# Patient Record
Sex: MALE | Race: WHITE | NOT HISPANIC OR LATINO | Employment: FULL TIME | URBAN - METROPOLITAN AREA
[De-identification: names, ages, dates, MRNs, and addresses within clinical notes are randomized per-mention and may not be internally consistent; named-entity substitution may affect disease eponyms.]

---

## 2017-05-15 ENCOUNTER — OFFICE VISIT (OUTPATIENT)
Dept: URGENT CARE | Facility: CLINIC | Age: 42
End: 2017-05-15
Payer: COMMERCIAL

## 2017-05-15 VITALS
WEIGHT: 182 LBS | DIASTOLIC BLOOD PRESSURE: 90 MMHG | HEART RATE: 73 BPM | OXYGEN SATURATION: 98 % | BODY MASS INDEX: 24.65 KG/M2 | TEMPERATURE: 98.6 F | SYSTOLIC BLOOD PRESSURE: 120 MMHG | HEIGHT: 72 IN

## 2017-05-15 DIAGNOSIS — H10.32 ACUTE BACTERIAL CONJUNCTIVITIS OF LEFT EYE: ICD-10-CM

## 2017-05-15 PROCEDURE — 99214 OFFICE O/P EST MOD 30 MIN: CPT | Performed by: PHYSICIAN ASSISTANT

## 2017-05-15 RX ORDER — POLYMYXIN B SULFATE AND TRIMETHOPRIM 1; 10000 MG/ML; [USP'U]/ML
1 SOLUTION OPHTHALMIC EVERY 4 HOURS
Qty: 10 ML | Refills: 0 | Status: SHIPPED | OUTPATIENT
Start: 2017-05-15 | End: 2017-05-25

## 2017-05-15 ASSESSMENT — ENCOUNTER SYMPTOMS
FEVER: 0
VOMITING: 0
CHILLS: 0
NAUSEA: 0
EYE REDNESS: 1
WHEEZING: 0
SHORTNESS OF BREATH: 0
EYE DISCHARGE: 1

## 2017-05-15 NOTE — MR AVS SNAPSHOT
"        Moy Enamorado   5/15/2017 6:30 PM   Office Visit   MRN: [id number]    Department:  [facility]   Dept Phone:  [phone]    Description:  Male : 1975   Provider:  Ranjit Garcia PA-C           Reason for Visit     Pink Eye L eye,feels sore,itchy this started today       Allergies as of 5/15/2017     No Known Allergies      You were diagnosed with     Acute bacterial conjunctivitis of left eye   [[id number]]         Vital Signs     Blood Pressure Pulse Temperature Height Weight Body Mass Index    120/90 mmHg 73 37 °C (98.6 °F) 1.829 m (6' 0.01\") 82.555 kg (182 lb) 24.68 kg/m2    Oxygen Saturation                   98%           Basic Information     Date Of Birth Sex Race Ethnicity Preferred Language    1975 Male White Non- English      Health Maintenance        Date Due Completion Dates    IMM DTaP/Tdap/Td Vaccine (1 - Tdap) 3/15/1993 ---            Current Immunizations     No immunizations on file.      Below and/or attached are the medications your provider expects you to take. Review all of your home medications and newly ordered medications with your provider and/or pharmacist. Follow medication instructions as directed by your provider and/or pharmacist. Please keep your medication list with you and share with your provider. Update the information when medications are discontinued, doses are changed, or new medications (including over-the-counter products) are added; and carry medication information at all times in the event of emergency situations     Allergies:  No Known Allergies          Medications  Valid as of: May 16, 2017 -  1:10 PM    Generic Name Brand Name Tablet Size Instructions for use    Penicillin V Potassium (Tab) VEETID 500 MG Take 1 Tab by mouth 3 times a day.        Polymyxin B-Trimethoprim (Solution) POLYTRIM 14814-8.1 UNIT/ML-% Place 1 Drop in left eye every 4 hours for 10 days.        .                 Medicines prescribed today were sent to:    " [facility] [address] [facility] [place] - [address]    [address] [place] [address]    Phone: [phone] Fax: [phone]    Open 24 Hours?: Yes      Medication refill instructions:       If your prescription bottle indicates you have medication refills left, it is not necessary to call your provider’s office. Please contact your pharmacy and they will refill your medication.    If your prescription bottle indicates you do not have any refills left, you may request refills at any time through one of the following ways: The online [employer] system (except Urgent Care), by calling your provider’s office, or by asking your pharmacy to contact your provider’s office with a refill request. Medication refills are processed only during regular business hours and may not be available until the next business day. Your provider may request additional information or to have a follow-up visit with you prior to refilling your medication.   *Please Note: Medication refills are assigned a new Rx number when refilled electronically. Your pharmacy may indicate that no refills were authorized even though a new prescription for the same medication is available at the pharmacy. Please request the medicine by name with the pharmacy before contacting your provider for a refill.           [employer] Access Code: [email or web address]  Expires: 6/15/2017  1:10 PM    [employer]  A secure, online tool to manage your health information     [employer]’s [employer]® is a secure, online tool that connects you to your personalized health information from the privacy of your home -- day or night - making it very easy for you to manage your healthcare. Once the activation process is completed, you can even access your medical information using the [employer] aga, which is available for free in the [employer] Aga store or Google Play store.     [employer] provides the following levels of access (as shown below):   My Chart  Features   [facility] Doctor [facility]  Specialists [facility]  Urgent  Care [facility]  Primary Care  Doctor   Email your healthcare team securely and privately 24/7 X X X    Manage appointments: schedule your next appointment; view details of past/upcoming appointments X      Request prescription refills. X      View recent personal medical records, including lab and immunizations X X X X   View health record, including health history, allergies, medications X X X X   Read reports about your outpatient visits, procedures, consult and ER notes X X X X   See your discharge summary, which is a recap of your hospital and/or ER visit that includes your diagnosis, lab results, and care plan. X X       How to register for [employer]:  1. Go to  [email or web address].  2. Click on the Sign Up Now box, which takes you to the New Member Sign Up page. You will need to provide the following information:  a. Enter your [employer] Access Code exactly as it appears at the top of this page. (You will not need to use this code after you’ve completed the sign-up process. If you do not sign up before the expiration date, you must request a new code.)   b. Enter your date of birth.   c. Enter your home email address.   d. Click Submit, and follow the next screen’s instructions.  3. Create a [employer] ID. This will be your [employer] login ID and cannot be changed, so think of one that is secure and easy to remember.  4. Create a [employer] password. You can change your password at any time.  5. Enter your Password Reset Question and Answer. This can be used at a later time if you forget your password.   6. Enter your e-mail address. This allows you to receive e-mail notifications when new information is available in [employer].  7. Click Sign Up. You can now view your health information.    For assistance activating your [employer] account, call [phone]

## 2017-05-16 ASSESSMENT — ENCOUNTER SYMPTOMS
PHOTOPHOBIA: 0
COUGH: 0
EYE PAIN: 0
BLURRED VISION: 0
DOUBLE VISION: 0
SORE THROAT: 0
SPUTUM PRODUCTION: 0

## 2017-05-16 NOTE — PROGRESS NOTES
"Subjective:      Moy Enamorado is a 42 y.o. male who presents with Pink Eye            Other  Pertinent negatives include no chills, congestion, coughing, fever, nausea, rash, sore throat or vomiting.   notes to left eye, irritation, redness and lots of discharge from left eye, gunky eye between lid, vision is still normal w/ discharge, fatigue feeling to eye, denies fever/chills/sorethroat/cough/ear pain/nausea/votimnig. Daughter had conjunctivitis.     Review of Systems   Constitutional: Negative for fever and chills.   HENT: Negative for congestion, ear pain and sore throat.    Eyes: Positive for discharge and redness. Negative for blurred vision, double vision, photophobia and pain.   Respiratory: Negative for cough, sputum production, shortness of breath and wheezing.    Gastrointestinal: Negative for nausea and vomiting.   Skin: Negative for rash.       PMH:  has no past medical history on file.  MEDS:   Current outpatient prescriptions:   •  penicillin v potassium (VEETID) 500 MG Tab, Take 1 Tab by mouth 3 times a day., Disp: 28 Tab, Rfl: 0  ALLERGIES: No Known Allergies  SURGHX: No past surgical history on file.  SOCHX:    FH: Family history was reviewed, no pertinent findings to report    I have worn a mask for the entire encounter with this patient.      Objective:     /90 mmHg  Pulse 73  Temp(Src) 37 °C (98.6 °F)  Ht 1.829 m (6' 0.01\")  Wt 82.555 kg (182 lb)  BMI 24.68 kg/m2  SpO2 98%     Physical Exam   Constitutional: He is oriented to person, place, and time. He appears well-developed and well-nourished. No distress.   HENT:   Head: Normocephalic and atraumatic.   Right Ear: External ear normal.   Left Ear: External ear normal.   Nose: Nose normal.   Eyes: EOM and lids are normal. Pupils are equal, round, and reactive to light. Right eye exhibits no chemosis, no discharge, no exudate and no hordeolum. No foreign body present in the right eye. Left eye exhibits exudate. Left eye exhibits no " chemosis, no discharge and no hordeolum. No foreign body present in the left eye. Right conjunctiva has no hemorrhage. Left conjunctiva is injected. Left conjunctiva has no hemorrhage. No scleral icterus.   Neck: Neck supple.   Pulmonary/Chest: Effort normal. No respiratory distress.   Musculoskeletal: Normal range of motion.   Neurological: He is alert and oriented to person, place, and time. Coordination normal.   Skin: Skin is warm and dry. He is not diaphoretic. No pallor.   Psychiatric: He has a normal mood and affect.   Nursing note and vitals reviewed.              Assessment/Plan:   1. Acute bacterial conjunctivitis of left eye  Supportive care is reviewed with patient/caregiver - recommend to push PO fluids and electrolytes, we discuss the typical course of bacterial conj, as well as review concern for infectious nature of dz  Return to clinic with lack of resolution or progression of symptoms.  ER precautions with any worsening symptoms are reviewed with patient/caregiver and they do express understanding    - polymixin-trimethoprim (POLYTRIM) 08847-7.1 UNIT/ML-% Solution; Place 1 Drop in left eye every 4 hours for 10 days.  Dispense: 10 mL; Refill: 0

## 2017-09-30 ENCOUNTER — OFFICE VISIT (OUTPATIENT)
Dept: URGENT CARE | Facility: CLINIC | Age: 42
End: 2017-09-30
Payer: COMMERCIAL

## 2017-09-30 VITALS
TEMPERATURE: 98.2 F | HEIGHT: 72 IN | HEART RATE: 72 BPM | OXYGEN SATURATION: 98 % | RESPIRATION RATE: 16 BRPM | WEIGHT: 177 LBS | DIASTOLIC BLOOD PRESSURE: 78 MMHG | SYSTOLIC BLOOD PRESSURE: 114 MMHG | BODY MASS INDEX: 23.98 KG/M2

## 2017-09-30 DIAGNOSIS — S61.412A LACERATION OF LEFT HAND WITHOUT FOREIGN BODY, INITIAL ENCOUNTER: ICD-10-CM

## 2017-09-30 PROCEDURE — 12001 RPR S/N/AX/GEN/TRNK 2.5CM/<: CPT | Performed by: PHYSICIAN ASSISTANT

## 2017-09-30 RX ORDER — CEFUROXIME AXETIL 500 MG/1
500 TABLET ORAL 2 TIMES DAILY
Qty: 10 TAB | Refills: 0 | Status: SHIPPED | OUTPATIENT
Start: 2017-09-30 | End: 2017-10-05

## 2017-09-30 ASSESSMENT — ENCOUNTER SYMPTOMS
CONSTITUTIONAL NEGATIVE: 1
NEUROLOGICAL NEGATIVE: 1
MUSCULOSKELETAL NEGATIVE: 1
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
## 2017-11-16 ENCOUNTER — OFFICE VISIT (OUTPATIENT)
Dept: MEDICAL GROUP | Facility: MEDICAL CENTER | Age: 42
End: 2017-11-16
Payer: COMMERCIAL

## 2017-11-16 VITALS
WEIGHT: 186.29 LBS | BODY MASS INDEX: 25.23 KG/M2 | TEMPERATURE: 97.7 F | RESPIRATION RATE: 20 BRPM | SYSTOLIC BLOOD PRESSURE: 120 MMHG | HEART RATE: 84 BPM | HEIGHT: 72 IN | DIASTOLIC BLOOD PRESSURE: 80 MMHG | OXYGEN SATURATION: 97 %

## 2017-11-16 DIAGNOSIS — Z13.1 SCREENING FOR DIABETES MELLITUS: ICD-10-CM

## 2017-11-16 DIAGNOSIS — Z00.00 HEALTHCARE MAINTENANCE: ICD-10-CM

## 2017-11-16 DIAGNOSIS — Z23 NEED FOR VACCINATION: ICD-10-CM

## 2017-11-16 DIAGNOSIS — Z23 NEED FOR INFLUENZA VACCINATION: ICD-10-CM

## 2017-11-16 DIAGNOSIS — Z13.6 SCREENING FOR ISCHEMIC HEART DISEASE: ICD-10-CM

## 2017-11-16 PROCEDURE — 90471 IMMUNIZATION ADMIN: CPT | Performed by: FAMILY MEDICINE

## 2017-11-16 PROCEDURE — 90686 IIV4 VACC NO PRSV 0.5 ML IM: CPT | Performed by: FAMILY MEDICINE

## 2017-11-16 PROCEDURE — 99396 PREV VISIT EST AGE 40-64: CPT | Mod: 25 | Performed by: FAMILY MEDICINE

## 2017-11-16 ASSESSMENT — PATIENT HEALTH QUESTIONNAIRE - PHQ9: CLINICAL INTERPRETATION OF PHQ2 SCORE: 0

## 2017-11-17 NOTE — PROGRESS NOTES
[facility]  Progress Note  Established Patient    Subjective:   Moy Enamorado is a 42 y.o. male here today for a wellness exam.     Healthcare maintenance  Lipids: ordered.   Fasting Glucose: ordered.    PHQ2: done 11/16/17 and normal.     Flu vaccine: given 11/16/17.   Tdap: recommended. Patient will check with insurance to ensure coverage.    Patient does try to eat well. He does not exercise much. He does have occasional sleep maintenance and sleep onset insomnia. He has been using melatonin recently and is tolerating the medicine well.      No current outpatient prescriptions on file prior to visit.     No current facility-administered medications on file prior to visit.        Past Medical History:   Diagnosis Date   • Asthma     Childhood   • Migraine        Allergies: Patient has no known allergies.    Surgical History:  has a past surgical history that includes nasal reconstruction (1990).    Social History:  reports that he has never smoked. He has never used smokeless tobacco. He reports that he drinks about 3.0 oz of alcohol per week . He reports that he does not use drugs.    ROS: no fever.        Objective:     Vitals:    11/16/17 1610   BP: 120/80   Pulse: 84   Resp: 20   Temp: 36.5 °C (97.7 °F)   SpO2: 97%   Weight: 84.5 kg (186 lb 4.6 oz)   Height: 1.829 m (6')       Physical Exam:  General: alert in no apparent distress.   Cardio: regular rate and rhythm, no murmurs, rubs or gallops.   Resp: CTAB no w/r/r.   Abd: soft, NTND.         Assessment and Plan:     1. Need for influenza vaccination  - INFLUENZA VACCINE QUAD INJ >3Y(PF)    2. Healthcare maintenance  - discussed diet and exercise.   - discussed sleep hygiene, handout given. Advised patient he may continue to use melatonin but recommended 1 mg nightly.   - see HPI.       Followup: Return in about 1 year (around 11/16/2018), or if symptoms worsen or fail to improve, for Wellness Visit, Long.

## 2018-06-06 ENCOUNTER — OFFICE VISIT (OUTPATIENT)
Dept: MEDICAL GROUP | Facility: MEDICAL CENTER | Age: 43
End: 2018-06-06
Payer: COMMERCIAL

## 2018-06-06 VITALS
OXYGEN SATURATION: 95 % | WEIGHT: 179 LBS | HEIGHT: 72 IN | DIASTOLIC BLOOD PRESSURE: 70 MMHG | SYSTOLIC BLOOD PRESSURE: 108 MMHG | BODY MASS INDEX: 24.24 KG/M2 | TEMPERATURE: 98.7 F | HEART RATE: 58 BPM | RESPIRATION RATE: 18 BRPM

## 2018-06-06 DIAGNOSIS — M54.32 SCIATICA OF LEFT SIDE: ICD-10-CM

## 2018-06-06 PROCEDURE — 99214 OFFICE O/P EST MOD 30 MIN: CPT | Performed by: FAMILY MEDICINE

## 2018-06-06 RX ORDER — GABAPENTIN 100 MG/1
CAPSULE ORAL
Qty: 60 CAP | Refills: 0 | Status: SHIPPED | OUTPATIENT
Start: 2018-06-06 | End: 2019-05-06

## 2018-06-06 ASSESSMENT — PAIN SCALES - GENERAL: PAINLEVEL: 3=SLIGHT PAIN

## 2018-06-08 NOTE — PROGRESS NOTES
This medical record contains text that has been entered with the assistance of computer voice recognition and dictation software.  Therefore, it may contain unintended errors in text, spelling, punctuation, or grammar        Chief Complaint   Patient presents with   • Pain     Lt. gluteus pain x 1 mo   • Tingling     Lt. foot x 1 day       Moy Enamorado is a 43 y.o. male here evaluation and management of: L foot tingling and L gluteal pain which started no injury or inciting event 2-3 days ago   Pt has had simmilar symptoms R side before. He describes himself as sedentery but did admit to starting to try to run on the tredmil more lately   No fever/chill        Current Outpatient Prescriptions   Medication Sig Dispense Refill   • gabapentin (NEURONTIN) 100 MG Cap Take 1-3 capsules at night as needed 60 Cap 0     No current facility-administered medications for this visit.      Patient Active Problem List    Diagnosis Date Noted   • Healthcare maintenance 11/16/2017     Past Surgical History:   Procedure Laterality Date   • NASAL RECONSTRUCTION  1990      Social History   Substance Use Topics   • Smoking status: Never Smoker   • Smokeless tobacco: Never Used   • Alcohol use 3.0 oz/week     5 Cans of beer per week     Family History   Problem Relation Age of Onset   • Other Mother      Tremor           ROS    all review of system completed and negative except for those listed above     Objective:     Blood pressure 108/70, pulse (!) 58, temperature 37.1 °C (98.7 °F), resp. rate 18, height 1.829 m (6'), weight 81.2 kg (179 lb), SpO2 95 %. Body mass index is 24.28 kg/m².  Physical Exam:        GEN: comfortable, alert and oriented, well nourished, well developed, in no apparent distress   HEENT: NCAT, eyes: pupils equal and reactive, sclera white, EOMIT, good dentition  HEART: limbs warm and well perfused, regular rate, no JVD, no lower extremity edema  LUNGS: speaking in full sentences, not in apparent respiratory  distress, no audible wheezes  MSK: normal tone and bulk, no swelling of the joints, gait steady and normal       No midline ttp back  + straight leg raise  No ttp gluteal area   Lower extremity strenght sensation and reflex in tact   Able to toe walk no difficulty   ?weakly positive gluteal challenge exercises          Assessment and Plan:   The following treatment plan was discussed        Problem List Items Addressed This Visit     None      Visit Diagnoses     Sciatica of left side        Relevant Medications    gabapentin (NEURONTIN) 100 MG Cap    Other Relevant Orders    REFERRAL TO PHYSICAL THERAPY Reason for Therapy: Eval/Treat/Report    DX-LUMBAR SPINE-2 OR 3 VIEWS        I discuss RICE therapy   Ref to PT   I would like him to get X ray as he had + straight leg raise   I cannot tell if this is lumbosacral radiculopathy or gluteal strain-->sciatica but I am leaning towards lumbosacral given the ++ straight leg raise          Instructed to follow up if symptoms worsen or fail to improve, ER/UC precautions discussed as well    Inge Avalos MD  [facility]   [address] [place] [address]   [place] [id number]  Phone: [phone]

## 2018-06-22 ENCOUNTER — PHYSICAL THERAPY (OUTPATIENT)
Dept: PHYSICAL THERAPY | Facility: REHABILITATION | Age: 43
End: 2018-06-22
Attending: FAMILY MEDICINE
Payer: COMMERCIAL

## 2018-06-22 DIAGNOSIS — M51.27 LUMBOSACRAL DISC HERNIATION: ICD-10-CM

## 2018-06-22 DIAGNOSIS — R29.898 WEAKNESS OF LEFT LOWER EXTREMITY: ICD-10-CM

## 2018-06-22 DIAGNOSIS — M54.42 ACUTE BILATERAL LOW BACK PAIN WITH LEFT-SIDED SCIATICA: ICD-10-CM

## 2018-06-22 PROCEDURE — 97110 THERAPEUTIC EXERCISES: CPT

## 2018-06-22 PROCEDURE — 97535 SELF CARE MNGMENT TRAINING: CPT

## 2018-06-22 PROCEDURE — 97161 PT EVAL LOW COMPLEX 20 MIN: CPT

## 2018-06-22 SDOH — ECONOMIC STABILITY: GENERAL: QUALITY OF LIFE: GOOD

## 2018-06-22 ASSESSMENT — ENCOUNTER SYMPTOMS
ALLEVIATING FACTORS: STRETCHING
QUALITY: SHARP
QUALITY: TINGLING
EXACERBATED BY: ACTIVITY
EXACERBATED BY: STAIR CLIMBING
PAIN SCALE: 2
PAIN SCALE AT LOWEST: 1
EXACERBATED BY: RUNNING
PAIN SCALE AT HIGHEST: 4
PAIN TIMING: WHEN ACTIVE
EXACERBATED BY: JUMPING
QUALITY: TIGHTNESS

## 2018-06-22 NOTE — OP THERAPY EVALUATION
Outpatient Physical Therapy  INITIAL EVALUATION    [facility] [address]  [address]  [facility] [address]  Phone:  [phone]  Fax:  [phone]    Date of Evaluation: 2018    Patient: Moy Enamorado  YOB: 1975  MRN: [id number]     Referring Provider: Inge Avalos M.D.  [address]  [address]  [place] [address]   Referring Diagnosis Sciatica of left side [M54.32]     Time Calculation  Start time: 1420  Stop time: 1500 Time Calculation (min): 40 minutes     Physical Therapy Occurrence Codes    Date of onset of impairment:  18   Date physical therapy care plan established or reviewed:  18   Date physical therapy treatment started:  18          Chief Complaint: No chief complaint on file.    Visit Diagnoses     ICD-10-CM   1. Acute bilateral low back pain with left-sided sciatica M54.42   2. Weakness of left lower extremity R29.898   3. Lumbosacral disc herniation M51.27         Subjective:   History of Present Illness:     Date of onset:  2018    Mechanism of injury:  Patient has been having about one month of pain into his left glute and left leg. He had a similar injury about 8 years ago, but his symptoms were into both legs, he did treat with PT at that time, but felt that this actually made his symptoms worse. As soon as he stopped PT his symptoms resolved. Currently, he feels like there is a post-it note/tingling sensation into the bottom of his left foot. He has tried to do some stretching which has alleviated some of his symptoms, but not all. In addition to his low back symptoms he will also feel some increased pain into the knees when walking downhill. He would like to be able to run, hike, and swim for fitness but at this time is unable to due to his leg symptoms   Quality of life:  Good  Sleep disturbance:  Not disrupted  Pain:     Current pain ratin    At best pain ratin    At worst pain ratin    Location:   Left glute, hamstring     Quality:  Tightness, tingling and sharp    Pain timing:  When active    Relieving factors:  Stretching    Aggravating factors:  Activity, jumping, running and stair climbing  Social Support:     Lives with:  Spouse and young children  Treatments:     Previous treatment:  Physical therapy    Current treatment:  Activity modification  Patient Goals:     Patient goals for therapy:  Return to sport/leisure activities, increased motion, decreased pain and increased strength      Past Medical History:   Diagnosis Date   • Asthma     Childhood   • Migraine      Past Surgical History:   Procedure Laterality Date   • NASAL RECONSTRUCTION  1990     Social History   Substance Use Topics   • Smoking status: Never Smoker   • Smokeless tobacco: Never Used   • Alcohol use 3.0 oz/week     5 Cans of beer per week     Family and Occupational History     Social History   • Marital status:      Spouse name: N/A   • Number of children: N/A   • Years of education: N/A       Objective     Postural Observations  Seated posture: fair  Standing posture: fair  Correction of posture: makes symptoms better        Neurological Testing     Reflexes   Left   Patellar (L4): normal (2+)    Right   Patellar (L4): normal (2+)    Myotome testing   Lumbar (left)   L2 (hip flexors): 4  L3 (knee extensors): 4-  L4 (ankle dorsiflexors): 4-  L5 (great toe extension): 3+    Lumbar (right)   L2 (hip flexors): 4+  L3 (knee extensors): 4  L4 (ankle dorsiflexors): 4  L5 (great toe extension): 4    Dermatome testing   Lumbar (left)   All left lumbar dermatomes intact    Lumbar (right)   All right lumbar dermatomes intact    Palpation   Left   Tenderness of the gluteus mekhi, lumbar paraspinals and proximal biceps femoris.     Right   No palpable tenderness to the proximal biceps femoris.   Tenderness of the gluteus mekhi and lumbar paraspinals.     Active Range of Motion     Lumbar   Flexion: decreased  Extension:  decreased  Left rotation: decreased  Right rotation: decreased    Tests       Lumbar spine (left)      Positive slump.   Lumbar spine (right)     Positive slump.     Left Hip   SLR: Positive.     Right Hip   SLR: Positive.     Basim LumbarTest   Static tests   Lying prone: reduced symptoms  Lying prone on elbows: reduced symptoms    Lying repeated motions:   Flexion in lying     Symptoms during testing: increases        Therapeutic Exercises (CPT 21045):     1. Prone lying , x 5 min     2. Prone on elbows, x 3 min     Therapeutic Treatments and Modalities:     1. Functional Training, Self Care (CPT 88369), Provided education regarding diagnosis, prognosis, and therapeutic exercise Rx related to mechanical diagnosis. Also illustrated diagnosis with MedBridge disc herniation video    Therapeutic Treatment and Modalities Summary:      Time-based treatments/modalities:  Therapeutic exercise minutes (CPT 15958): 15 minutes  Functional training, self care minutes (CPT 76549): 8 minutes       Assessment, Response and Plan:   Impairments: abnormal or restricted ROM, impaired functional mobility, impaired physical strength, lacks appropriate home exercise program, limited mobility and pain with function    Assessment details:  Patient is a 43 year old male who presents to therapy for evaluation and treatment of left lower extremity weakness and numbness. Impairments include decreased ROM into lumbar flexion, extension, and rotation, decreased strength into L3-S1 myotomes of the LLE, decreased functional strength in squatting as well as single leg stance, and positive test for neural tension and likely lumbar disc derangement. These impairments limit his ability to run, hike, and  his daughter and are consistent with diagnosis of lumbar radiculopathy secondary to possible disc derangement. Patient would benefit from skilled physical therapy intervention including: patient education, manual therapy, therapeutic  exercise, therapeutic activity, neuromuscular re-education, and modalities as needed for pain to address the above mentioned impairments and prevent further functional decline. If you have any questions involving this plan of care please feel free to contact our clinic at [phone]  Barriers to therapy:  Financial  Prognosis: good    Goals:   Short Term Goals:     1 - Patient will have no less than 70 degrees of hamstring mobility with SLR  2 - Patient will no strength no less than 4+/5 in L3-S1 myotomes   Short term goal time span:  2-4 weeks      Long Term Goals:      1 - Patient will have a negative SLR test   2 - Patient have no less than 90 degress og hamstring mobility with SLR  3 - Patient will be able to walk downhill without increased knee pain     Long term goal time span:  6-8 weeks    Plan:   Therapy options:  Physical therapy treatment to continue  Planned therapy interventions:  E Stim Unattended (CPT 48864), Functional Training, Self Care (CPT 75537), Therapeutic Activities (CPT 29345), Therapeutic Exercise (CPT 65096), Manual Therapy (CPT 19300), Mechanical Traction (CPT 10105) and Neuromuscular Re-education (CPT 70714)  Frequency:  1x week  Duration in weeks:  8  Duration in visits:  8  Discussed with:  Patient      Functional Limitation G-Codes and Severity Modifiers      Current:     Goal:       Referring provider co-signature:  I have reviewed this plan of care and my co-signature certifies the need for services.  Certification Dates:   From 6/22/18     To 8/17/18    Physician Signature: ________________________________ Date: ______________

## 2018-07-12 ENCOUNTER — APPOINTMENT (OUTPATIENT)
Dept: PHYSICAL THERAPY | Facility: REHABILITATION | Age: 43
End: 2018-07-12
Attending: FAMILY MEDICINE
Payer: COMMERCIAL

## 2018-08-21 ENCOUNTER — TELEPHONE (OUTPATIENT)
Dept: PHYSICAL THERAPY | Facility: REHABILITATION | Age: 43
End: 2018-08-21

## 2018-08-21 NOTE — TELEPHONE ENCOUNTER
Patient has not followed up with this clinic regarding scheduling additional appointments since 18. As referral is now  and patient has made no attempt to follow up with clinic will D/C episode of care

## 2018-09-14 ENCOUNTER — APPOINTMENT (OUTPATIENT)
Dept: MEDICAL GROUP | Facility: MEDICAL CENTER | Age: 43
End: 2018-09-14
Payer: COMMERCIAL

## 2018-12-18 ENCOUNTER — OFFICE VISIT (OUTPATIENT)
Dept: MEDICAL GROUP | Facility: MEDICAL CENTER | Age: 43
End: 2018-12-18
Payer: COMMERCIAL

## 2018-12-18 VITALS
OXYGEN SATURATION: 95 % | DIASTOLIC BLOOD PRESSURE: 64 MMHG | WEIGHT: 182 LBS | BODY MASS INDEX: 24.65 KG/M2 | HEART RATE: 54 BPM | TEMPERATURE: 98.1 F | HEIGHT: 72 IN | RESPIRATION RATE: 18 BRPM | SYSTOLIC BLOOD PRESSURE: 112 MMHG

## 2018-12-18 DIAGNOSIS — B35.1 ONYCHOMYCOSIS: ICD-10-CM

## 2018-12-18 DIAGNOSIS — L60.0 INGROWN TOENAIL OF LEFT FOOT: ICD-10-CM

## 2018-12-18 PROCEDURE — 99213 OFFICE O/P EST LOW 20 MIN: CPT | Performed by: FAMILY MEDICINE

## 2018-12-18 ASSESSMENT — PATIENT HEALTH QUESTIONNAIRE - PHQ9: CLINICAL INTERPRETATION OF PHQ2 SCORE: 0

## 2018-12-19 NOTE — PROGRESS NOTES
[facility]  Progress Note  Established Patient    Subjective:   Moy Enamorado is a 43 y.o. male here today with a chief complaint of ingrown toenail The patient is alone.     Ingrown toenail of left foot  For the past 1 or 2 weeks the patient has noticed some pain due to an ingrown toenail on the medial portion of the great toe of his left foot.  He has tried apple cider vinegar soaks with minimal success.  There is no associated trauma.  His symptoms are moderate in severity.    Onychomycosis  Patient has struggled with dystrophic nails of his right foot since the age of 13.  He is using apple cider vinegar soaks with limited success.      Current Outpatient Prescriptions on File Prior to Visit   Medication Sig Dispense Refill   • gabapentin (NEURONTIN) 100 MG Cap Take 1-3 capsules at night as needed (Patient not taking: Reported on 12/18/2018) 60 Cap 0     No current facility-administered medications on file prior to visit.        Past Medical History:   Diagnosis Date   • Asthma     Childhood   • Migraine        Allergies: Patient has no known allergies.    Surgical History:  has a past surgical history that includes nasal reconstruction (1990).    Family History: family history includes Other in his mother.    Social History:  reports that he has never smoked. He has never used smokeless tobacco. He reports that he drinks about 3.0 oz of alcohol per week . He reports that he does not use drugs.    ROS: no fever or nausea.        Objective:     Vitals:    12/18/18 1614   BP: 112/64   BP Location: Right arm   Patient Position: Sitting   BP Cuff Size: Large adult   Pulse: (!) 54   Resp: 18   Temp: 36.7 °C (98.1 °F)   TempSrc: Temporal   SpO2: 95%   Weight: 82.6 kg (182 lb)   Height: 1.829 m (6')       Physical Exam:  General: alert in no apparent distress.   Skin: On the left foot the medial portion of the great toe is an ingrown toenail with some associated swelling but no redness or warmth.  There is  tenderness.  The spicule is identified.        Assessment and Plan:     1. Ingrown toenail of left foot  No indication for abx.   - cotton wisp applied, band aid placed.   -Informed the patient that he should avoid getting the area wet.  I informed him that if the cotton wisp of falls out he may leave it.  I informed him that if the cotton wisp starts to become foul-smelling if there is an increase in pain, discharge, redness, swelling or warmth, he needs to go to an urgent care for reevaluation.  - call with new/worsening/persistent sx.     2. Onychomycosis  - offered lamisil, patient declines.         Followup: Return if symptoms worsen or fail to improve.

## 2018-12-19 NOTE — ASSESSMENT & PLAN NOTE
For the past 1 or 2 weeks the patient has noticed some pain due to an ingrown toenail on the medial portion of the great toe of his left foot.  He has tried apple cider vinegar soaks with minimal success.  There is no associated trauma.  His symptoms are moderate in severity.

## 2018-12-19 NOTE — ASSESSMENT & PLAN NOTE
Patient has struggled with dystrophic nails of his right foot since the age of 13.  He is using apple cider vinegar soaks with limited success.

## 2019-05-06 ENCOUNTER — OFFICE VISIT (OUTPATIENT)
Dept: MEDICAL GROUP | Facility: MEDICAL CENTER | Age: 44
End: 2019-05-06
Payer: COMMERCIAL

## 2019-05-06 VITALS
BODY MASS INDEX: 24.38 KG/M2 | WEIGHT: 180 LBS | OXYGEN SATURATION: 97 % | HEART RATE: 58 BPM | SYSTOLIC BLOOD PRESSURE: 104 MMHG | DIASTOLIC BLOOD PRESSURE: 64 MMHG | TEMPERATURE: 97.8 F | RESPIRATION RATE: 18 BRPM | HEIGHT: 72 IN

## 2019-05-06 DIAGNOSIS — R05.9 COUGH: ICD-10-CM

## 2019-05-06 PROCEDURE — 99214 OFFICE O/P EST MOD 30 MIN: CPT | Performed by: FAMILY MEDICINE

## 2019-05-06 RX ORDER — ALBUTEROL SULFATE 90 UG/1
1-2 AEROSOL, METERED RESPIRATORY (INHALATION) EVERY 6 HOURS PRN
Qty: 1 INHALER | Refills: 1 | Status: SHIPPED | OUTPATIENT
Start: 2019-05-06 | End: 2021-11-23

## 2019-05-06 ASSESSMENT — PATIENT HEALTH QUESTIONNAIRE - PHQ9: CLINICAL INTERPRETATION OF PHQ2 SCORE: 0

## 2019-05-06 NOTE — PROGRESS NOTES
[facility]  Progress Note  Established Patient    Subjective:   Moy Enamorado is a 44 y.o. male here today with a chief complaint of cough. The patient is alone.     Cough  Patient describes a 1 week history of an occasionally productive cough with sore throat and fatigue.  He also describes some musculoskeletal chest pain on inspiration but no shortness of breath.  He endorses an occasional wheeze.  He had asthma as a child.  He denies fever.  He denies rhinorrhea.  There are no known alleviating factors.  Symptoms are mild in severity.       No current outpatient prescriptions on file prior to visit.     No current facility-administered medications on file prior to visit.        Past Medical History:   Diagnosis Date   • Asthma     Childhood   • Migraine        Allergies: Patient has no known allergies.    Surgical History:  has a past surgical history that includes nasal reconstruction (1990).    Family History: family history includes Other in his mother.    Social History:  reports that he has never smoked. He has never used smokeless tobacco. He reports that he drinks about 3.0 oz of alcohol per week . He reports that he does not use drugs.    ROS: no fever or nausea.        Objective:     Vitals:    05/06/19 1320   BP: 104/64   BP Location: Left arm   Patient Position: Sitting   BP Cuff Size: Adult   Pulse: (!) 58   Resp: 18   Temp: 36.6 °C (97.8 °F)   TempSrc: Temporal   SpO2: 97%   Weight: 81.6 kg (180 lb)   Height: 1.829 m (6')       Physical Exam:  General: alert in no apparent distress.   Cardio: regular rate and rhythm, no murmurs, rubs or gallops.   Resp: CTAB no w/r/r.   ENMT: Pharyngeal cobblestoning.  No pharyngeal erythema.  Uvula midline.  No tonsillar exudates.  No anterior or posterior or supraclavicular lymphadenopathy.        Assessment and Plan:     1. Cough  Patient's history is consistent with a viral URI.  Per [facility] criteria, no indication for strep testing.  No indication for  flu testing as I would not initiate Tamiflu in the unlikely event that he has influenza.  Discussed the risk of bacterial transformation to the patient and the need to call with any new or worsening symptoms.  I informed him that his symptoms should progressively improve.  I will also prescribe albuterol for what appears to be a slight associated bronchospasm.         Followup: Return if symptoms worsen or fail to improve.

## 2019-05-06 NOTE — ASSESSMENT & PLAN NOTE
Patient describes a 1 week history of an occasionally productive cough with sore throat and fatigue.  He also describes some musculoskeletal chest pain on inspiration but no shortness of breath.  He endorses an occasional wheeze.  He had asthma as a child.  He denies fever.  He denies rhinorrhea.  There are no known alleviating factors.  Symptoms are mild in severity.

## 2020-03-18 ENCOUNTER — OFFICE VISIT (OUTPATIENT)
Dept: MEDICAL GROUP | Facility: MEDICAL CENTER | Age: 45
End: 2020-03-18
Payer: COMMERCIAL

## 2020-03-18 VITALS
BODY MASS INDEX: 22.59 KG/M2 | SYSTOLIC BLOOD PRESSURE: 122 MMHG | DIASTOLIC BLOOD PRESSURE: 68 MMHG | TEMPERATURE: 98.1 F | RESPIRATION RATE: 18 BRPM | HEART RATE: 60 BPM | HEIGHT: 72 IN | OXYGEN SATURATION: 96 % | WEIGHT: 166.8 LBS

## 2020-03-18 DIAGNOSIS — H61.22 IMPACTED CERUMEN OF LEFT EAR: ICD-10-CM

## 2020-03-18 DIAGNOSIS — J02.9 SORE THROAT: ICD-10-CM

## 2020-03-18 DIAGNOSIS — R59.1 LYMPHADENOPATHY: ICD-10-CM

## 2020-03-18 DIAGNOSIS — H61.20 WAX IN EAR: ICD-10-CM

## 2020-03-18 LAB
INT CON NEG: NEGATIVE
INT CON POS: POSITIVE
S PYO AG THROAT QL: NEGATIVE

## 2020-03-18 PROCEDURE — 69210 REMOVE IMPACTED EAR WAX UNI: CPT | Performed by: FAMILY MEDICINE

## 2020-03-18 PROCEDURE — 87880 STREP A ASSAY W/OPTIC: CPT | Performed by: FAMILY MEDICINE

## 2020-03-18 PROCEDURE — 99214 OFFICE O/P EST MOD 30 MIN: CPT | Mod: 25 | Performed by: FAMILY MEDICINE

## 2020-03-18 RX ORDER — AMOXICILLIN AND CLAVULANATE POTASSIUM 875; 125 MG/1; MG/1
1 TABLET, FILM COATED ORAL 2 TIMES DAILY
Qty: 10 TAB | Refills: 0 | Status: SHIPPED | OUTPATIENT
Start: 2020-03-18 | End: 2020-03-23

## 2020-03-18 ASSESSMENT — PATIENT HEALTH QUESTIONNAIRE - PHQ9: CLINICAL INTERPRETATION OF PHQ2 SCORE: 0

## 2020-03-18 NOTE — PROGRESS NOTES
"[facility]  Progress Note  Established Patient    Subjective:   Moy Enamorado is a 45 y.o. male here today with a chief complaint of a bump. The patient is alone.     Lymphadenopathy  The patient states that on Sunday he noticed a bump in front of his left ear that is mildly painful when he touches it. He denies fever, chills, SOB or cough but has had a mild severity, intermittent sore throat. He doesn't identify any alleviating or exacerbating factors.     Sore throat  Please see \"lymphadenopathy\".     Cerumen impaction  Noted on exam. There is no ear pain.       Current Outpatient Medications on File Prior to Visit   Medication Sig Dispense Refill   • albuterol (PROAIR HFA) 108 (90 Base) MCG/ACT Aero Soln inhalation aerosol Inhale 1-2 Puffs by mouth every 6 hours as needed for Shortness of Breath. 1 Inhaler 1     No current facility-administered medications on file prior to visit.        Past Medical History:   Diagnosis Date   • Asthma     Childhood   • Migraine        Allergies: Patient has no known allergies.    Surgical History:  has a past surgical history that includes nasal reconstruction (1990).    Family History: family history includes Other in his mother.    Social History:  reports that he has never smoked. He has never used smokeless tobacco. He reports current alcohol use of about 3.0 oz of alcohol per week. He reports that he does not use drugs.    ROS: see HPI.        Objective:     Vitals:    03/18/20 1115   BP: 122/68   BP Location: Left arm   Patient Position: Sitting   BP Cuff Size: Adult   Pulse: 60   Resp: 18   Temp: 36.7 °C (98.1 °F)   TempSrc: Temporal   SpO2: 96%   Weight: 75.7 kg (166 lb 12.8 oz)   Height: 1.829 m (6')       Physical Exam:  General: alert in no apparent distress.   ENMT: slight pharyngeal erythema, no tonsillar exudate, no cervical LAD but there is a soft, moveable preauricular LN on the left that is slightly tender to palpation with no redness or warmth. It is " approximately 1 cm in size. L cerumen impaction. After removal, normal bilateral TM.  Skin: no infection over scalp.      Cerumen Removal Procedure Note:  Ear lavage by the medical assistant failed. Successful cerumen removal was achieved with alligator forceps on the L side. The patient tolerated the procedure well with no complications. Tympanic membranes was visualized after the procedure and was intact.    POC strep: negative.     Assessment and Plan:     1. Sore throat  POC strep negative, c/w viral pharyngitis.   - supportive care.     2. Lymphadenopathy  The patient has a L preauricular LAD with low risk of malignancy. Suspect due to recent viral illness. Recommended supportive care. If any worsening, start Augmentin and let me know. If no resolution in 2 weeks, start augmentin. Will see patient for re-examination in 3 weeks to ensure resolution. If no resolution, will pursue additional workup.   - amoxicillin-clavulanate (AUGMENTIN) 875-125 MG Tab; Take 1 Tab by mouth 2 times a day for 5 days.  Dispense: 10 Tab; Refill: 0    3. Impacted cerumen of left ear  - removed (see procedure note above).         Followup: Return in about 3 weeks (around 4/8/2020), or if symptoms worsen or fail to improve.

## 2020-03-18 NOTE — ASSESSMENT & PLAN NOTE
The patient states that on Sunday he noticed a bump in front of his left ear that is mildly painful when he touches it. He denies fever, chills, SOB or cough but has had a mild severity, intermittent sore throat. He doesn't identify any alleviating or exacerbating factors.

## 2020-11-09 ENCOUNTER — OFFICE VISIT (OUTPATIENT)
Dept: MEDICAL GROUP | Facility: MEDICAL CENTER | Age: 45
End: 2020-11-09
Payer: COMMERCIAL

## 2020-11-09 VITALS
HEART RATE: 54 BPM | BODY MASS INDEX: 23.3 KG/M2 | HEIGHT: 72 IN | DIASTOLIC BLOOD PRESSURE: 64 MMHG | TEMPERATURE: 97.5 F | WEIGHT: 172 LBS | RESPIRATION RATE: 20 BRPM | SYSTOLIC BLOOD PRESSURE: 108 MMHG | OXYGEN SATURATION: 96 %

## 2020-11-09 DIAGNOSIS — Z13.79 ASHKENAZI JEWISH ANCESTRY REQUIRING POPULATION-SPECIFIC GENETIC SCREENING: ICD-10-CM

## 2020-11-09 DIAGNOSIS — G56.01 CARPAL TUNNEL SYNDROME OF RIGHT WRIST: ICD-10-CM

## 2020-11-09 DIAGNOSIS — K64.4 HEMORRHOIDAL SKIN TAG: ICD-10-CM

## 2020-11-09 DIAGNOSIS — M54.50 ACUTE MIDLINE LOW BACK PAIN WITHOUT SCIATICA: ICD-10-CM

## 2020-11-09 DIAGNOSIS — H91.90 DECREASED HEARING, UNSPECIFIED LATERALITY: ICD-10-CM

## 2020-11-09 PROBLEM — M54.9 BACK PAIN: Status: ACTIVE | Noted: 2020-11-09

## 2020-11-09 PROCEDURE — 99214 OFFICE O/P EST MOD 30 MIN: CPT | Performed by: FAMILY MEDICINE

## 2020-11-09 NOTE — ASSESSMENT & PLAN NOTE
Patient recently got a trampoline at his home and has been playing on the trampoline with his child.  Since then, he has had 4 weeks of lower back pain that is slowly getting better.  He denies any acute trauma and denies bowel or bladder incontinence or retention or perineal anesthesia.  Stretching helps.

## 2020-11-10 NOTE — ASSESSMENT & PLAN NOTE
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  He notices that he sounds different on Zoom than he believes he truly sounds when he speaks.  He wonders if he could have some congenital hearing issues and would like his hearing formally tested.

## 2020-11-10 NOTE — ASSESSMENT & PLAN NOTE
Patient states that he is of Ashkenazi Religious heritage.  He is wondering if there are any screening considerations that should be made for him, including colon cancer screening.

## 2020-11-10 NOTE — PROGRESS NOTES
[facility]  Progress Note  Established Patient    Subjective:   Moy Enamorado is a 45 y.o. male here today with a chief complaint of back pain. The patient is alone. Both of us are masked.     Back pain  Patient recently got a trampoline at his home and has been playing on the trampoline with his child.  Since then, he has had 4 weeks of lower back pain that is slowly getting better.  He denies any acute trauma and denies bowel or bladder incontinence or retention or perineal anesthesia.  Stretching helps.    Hemorrhoidal skin tag  For the past year the patient has noticed a small tag near his anus.  He does have some rectal itching at times but it is not otherwise bothersome to him.  It is not painful.  There is no rectal blood.    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  He is wondering if there are any screening considerations that should be made for him, including colon cancer screening.    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  He notices that he sounds different on Zoom than he believes he truly sounds when he speaks.  He wonders if he could have some congenital hearing issues and would like his hearing formally tested.    Carpal tunnel syndrome of right wrist  Several weeks ago the patient noticed some tingling in the third finger of his right hand but since resolved.  He denies neck pain.      Current Outpatient Medications on File Prior to Visit   Medication Sig Dispense Refill   • albuterol (PROAIR HFA) 108 (90 Base) MCG/ACT Aero Soln inhalation aerosol Inhale 1-2 Puffs by mouth every 6 hours as needed for Shortness of Breath. 1 Inhaler 1     No current facility-administered medications on file prior to visit.        Past Medical History:   Diagnosis Date   • Asthma     Childhood   • Migraine        Allergies: Patient has no known allergies.    Surgical  History:  has a past surgical history that includes nasal reconstruction (1990).    Family History: family history includes Other in his mother.    Social History:  reports that he has never smoked. He has never used smokeless tobacco. He reports current alcohol use of about 3.0 oz of alcohol per week. He reports that he does not use drugs.    ROS: no fever or cough.        Objective:     Vitals:    11/09/20 1143   BP: 108/64   BP Location: Left arm   Patient Position: Sitting   BP Cuff Size: Adult   Pulse: (!) 54   Resp: 20   Temp: 36.4 °C (97.5 °F)   TempSrc: Temporal   SpO2: 96%   Weight: 78 kg (172 lb)   Height: 1.829 m (6')       Physical Exam:  General: alert in no apparent distress.   MSK: Negative Spurling's test bilaterally.  Tinel's sign is positive on the right wrist but Phalen sign is negative.  Sensation intact in all dermatomal distributions of the bilateral upper extremities and bilateral lower extremities.  Negative straight leg raise bilaterally.  No tenderness palpation over the spine.  : Patient has a residual hemorrhoidal skin tag with no mass.  ENMT: TM normal bilaterally.         Assessment and Plan:     1. Hemorrhoidal skin tag  -Discussed the importance of fluid and fiber along with Metamucil if needed.    2. 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  - REFERRAL TO GENETICS    3. Decreased hearing, unspecified laterality  Subjective.  - REFERRAL TO AUDIOLOGY    4. Acute midline low back pain without sciatica  Exam reassuring.   -Discussed supportive measures and exercises at home.  If no resolution in 2 weeks, the patient is to let me know.    5. Carpal tunnel syndrome of right wrist  - recommended nighttime wrist splinting.        Followup: Return if symptoms worsen or fail to improve.

## 2020-11-10 NOTE — ASSESSMENT & PLAN NOTE
Several weeks ago the patient noticed some tingling in the third finger of his right hand but since resolved.  He denies neck pain.

## 2020-11-10 NOTE — ASSESSMENT & PLAN NOTE
For the past year the patient has noticed a small tag near his anus.  He does have some rectal itching at times but it is not otherwise bothersome to him.  It is not painful.  There is no rectal blood.

## 2020-11-17 ENCOUNTER — TELEPHONE (OUTPATIENT)
Dept: MEDICAL GROUP | Facility: MEDICAL CENTER | Age: 45
End: 2020-11-17

## 2020-11-17 NOTE — TELEPHONE ENCOUNTER
Received the following message from the referrals dept. Please inform patient of this message:            Unfortunately, at this time, we are unable to schedule any new genetic counseling appointments. Our , Dr. Spencer, is retiring on November 20, 2020. We are happy to hold onto your referral as we are in negotiations with other genetic options, and should be able to start scheduling new patients again in early December, 2020. We appreciate your patience during this transition, and will continue to communicate any changes and updates with our genetics program as they come. Please inform your patient of the delay in scheduling. If you have any questions, please do not hesitate to call our office at [phone].     Thank you,     [facility]

## 2021-06-16 ENCOUNTER — HOSPITAL ENCOUNTER (EMERGENCY)
Facility: MEDICAL CENTER | Age: 46
End: 2021-06-17
Attending: EMERGENCY MEDICINE
Payer: COMMERCIAL

## 2021-06-16 DIAGNOSIS — F23 ACUTE PSYCHOSIS (HCC): ICD-10-CM

## 2021-06-16 DIAGNOSIS — F15.10 METHAMPHETAMINE ABUSE (HCC): ICD-10-CM

## 2021-06-16 LAB
ALBUMIN SERPL BCP-MCNC: 4.4 G/DL (ref 3.2–4.9)
ALBUMIN/GLOB SERPL: 1.7 G/DL
ALP SERPL-CCNC: 86 U/L (ref 30–99)
ALT SERPL-CCNC: 25 U/L (ref 2–50)
ANION GAP SERPL CALC-SCNC: 16 MMOL/L (ref 7–16)
AST SERPL-CCNC: 25 U/L (ref 12–45)
BASOPHILS # BLD AUTO: 0.6 % (ref 0–1.8)
BASOPHILS # BLD: 0.05 K/UL (ref 0–0.12)
BILIRUB SERPL-MCNC: 0.9 MG/DL (ref 0.1–1.5)
BUN SERPL-MCNC: 20 MG/DL (ref 8–22)
CALCIUM SERPL-MCNC: 8.9 MG/DL (ref 8.5–10.5)
CHLORIDE SERPL-SCNC: 107 MMOL/L (ref 96–112)
CO2 SERPL-SCNC: 18 MMOL/L (ref 20–33)
CREAT SERPL-MCNC: 0.92 MG/DL (ref 0.5–1.4)
EOSINOPHIL # BLD AUTO: 0.05 K/UL (ref 0–0.51)
EOSINOPHIL NFR BLD: 0.6 % (ref 0–6.9)
ERYTHROCYTE [DISTWIDTH] IN BLOOD BY AUTOMATED COUNT: 47 FL (ref 35.9–50)
ETHANOL BLD-MCNC: <10.1 MG/DL (ref 0–10)
GLOBULIN SER CALC-MCNC: 2.6 G/DL (ref 1.9–3.5)
GLUCOSE SERPL-MCNC: 103 MG/DL (ref 65–99)
HCT VFR BLD AUTO: 46.2 % (ref 42–52)
HGB BLD-MCNC: 15.2 G/DL (ref 14–18)
IMM GRANULOCYTES # BLD AUTO: 0.03 K/UL (ref 0–0.11)
IMM GRANULOCYTES NFR BLD AUTO: 0.3 % (ref 0–0.9)
LYMPHOCYTES # BLD AUTO: 1.65 K/UL (ref 1–4.8)
LYMPHOCYTES NFR BLD: 19.2 % (ref 22–41)
MCH RBC QN AUTO: 31.5 PG (ref 27–33)
MCHC RBC AUTO-ENTMCNC: 32.9 G/DL (ref 33.7–35.3)
MCV RBC AUTO: 95.9 FL (ref 81.4–97.8)
MONOCYTES # BLD AUTO: 0.66 K/UL (ref 0–0.85)
MONOCYTES NFR BLD AUTO: 7.7 % (ref 0–13.4)
NEUTROPHILS # BLD AUTO: 6.15 K/UL (ref 1.82–7.42)
NEUTROPHILS NFR BLD: 71.6 % (ref 44–72)
NRBC # BLD AUTO: 0 K/UL
NRBC BLD-RTO: 0 /100 WBC
PLATELET # BLD AUTO: 186 K/UL (ref 164–446)
PMV BLD AUTO: 11.7 FL (ref 9–12.9)
POTASSIUM SERPL-SCNC: 3.8 MMOL/L (ref 3.6–5.5)
PROT SERPL-MCNC: 7 G/DL (ref 6–8.2)
RBC # BLD AUTO: 4.82 M/UL (ref 4.7–6.1)
SODIUM SERPL-SCNC: 141 MMOL/L (ref 135–145)
WBC # BLD AUTO: 8.6 K/UL (ref 4.8–10.8)

## 2021-06-16 PROCEDURE — 96374 THER/PROPH/DIAG INJ IV PUSH: CPT

## 2021-06-16 PROCEDURE — 85025 COMPLETE CBC W/AUTO DIFF WBC: CPT

## 2021-06-16 PROCEDURE — 99285 EMERGENCY DEPT VISIT HI MDM: CPT

## 2021-06-16 PROCEDURE — 700111 HCHG RX REV CODE 636 W/ 250 OVERRIDE (IP): Performed by: EMERGENCY MEDICINE

## 2021-06-16 PROCEDURE — 80053 COMPREHEN METABOLIC PANEL: CPT

## 2021-06-16 PROCEDURE — 82077 ASSAY SPEC XCP UR&BREATH IA: CPT

## 2021-06-16 RX ORDER — LORAZEPAM 2 MG/ML
2 INJECTION INTRAMUSCULAR ONCE
Status: COMPLETED | OUTPATIENT
Start: 2021-06-16 | End: 2021-06-16

## 2021-06-16 RX ORDER — SODIUM CHLORIDE 9 MG/ML
1000 INJECTION, SOLUTION INTRAVENOUS ONCE
Status: DISCONTINUED | OUTPATIENT
Start: 2021-06-16 | End: 2021-06-17 | Stop reason: HOSPADM

## 2021-06-16 RX ADMIN — LORAZEPAM 2 MG: 2 INJECTION INTRAMUSCULAR; INTRAVENOUS at 22:24

## 2021-06-17 VITALS
HEART RATE: 74 BPM | BODY MASS INDEX: 23.3 KG/M2 | WEIGHT: 172 LBS | TEMPERATURE: 97.6 F | OXYGEN SATURATION: 99 % | DIASTOLIC BLOOD PRESSURE: 81 MMHG | RESPIRATION RATE: 14 BRPM | SYSTOLIC BLOOD PRESSURE: 122 MMHG | HEIGHT: 72 IN
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
## 2021-06-17 NOTE — ED PROVIDER NOTES
ED PROVIDER NOTE    Scribed for Karen Sheth M.D. by Des Erazo. 6/17/2021, 3:08 AM.    This is an addendum to the note on Moy Enamorado. For further details and full chart entry, see the previously signed ED Provider Note written by Dr. Gastelum (ERP).      3:08 AM - I discussed the patient's case with Dr. Gastelum (ERP) who will transfer care of the patient to me at this time.  Briefly this is a 46-year-old male who came in for methamphetamine abuse.  If he becomes clinically sober and has a safe ride home he can be discharged.  If he remains acutely psychotic may require behavioral health assessment.    3:40 AM - Patient is sleeping comfortably at this time.     7 AM-upon reassessment patient is awake, alert and oriented and ambulating with a steady gait without assistance.  He has no acute complaints at this time.  He seems clinically sober from his methamphetamine and family will pick him up.  He is discharged in stable condition.    FINAL IMPRESSION   Methamphetamine abuse       Des MENA (Scribe), am scribing for, and in the presence of, Karen Sheth M.D..    Electronically signed by: Des Erazo (Scribe), 6/17/2021    IKaren M.D. personally performed the services described in this documentation, as scribed by Des Erazo in my presence, and it is both accurate and complete.    The note accurately reflects work and decisions made by me.  Karen Sheth M.D.  6/17/2021  7:10 AM

## 2021-06-17 NOTE — ED NOTES
Pt wife arrived. Pt changed into clothing. Pt is drowsy but stable. Able to ambulate with steady gait. Wife taking resources provided by mental health nurse. Reviewed discharge instructions, pt verbalized understanding of instructions and medication. Denies further questions at this time. Pt ambulatory out of ER with steady gait.

## 2021-06-17 NOTE — DISCHARGE PLANNING
ALERT team  note:  Writer RN reviewed community CD and MH resources with pt and pt's wife with written information given, including AA/NA mtgs, [facility], [facility], [facility]; pt and pt's wife verbalized understanding; no SI, HI, or self-harm ideation noted; pt to DC to self today with transport by wife

## 2021-06-17 NOTE — ED NOTES
All personal items removed from room.  Pt in hospital clothing only.  Removed all sharps and potentially dangerous items from room.  No visitors.  Curtains open at all times.  Discussed suicide precautions and restrictions with pt and they are able to verbalize understanding.

## 2021-06-17 NOTE — ED NOTES
Patient resting on stretcher, respirations even and unlabored. Pt in direct line of sight of constant observer. Pt has no further needs at this time. Will continue to monitor.

## 2021-06-17 NOTE — ED NOTES
Pt awake, ambulating with steady gait. NAD noted. Pt answers all questions appropriately. Provided him breakfast. Reports he feels comfortable going home.   Wife will come before 1100.
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
## 2021-06-19 ENCOUNTER — HOSPITAL ENCOUNTER (EMERGENCY)
Facility: MEDICAL CENTER | Age: 46
End: 2021-06-19
Attending: EMERGENCY MEDICINE
Payer: COMMERCIAL

## 2021-06-19 VITALS
HEIGHT: 75 IN | DIASTOLIC BLOOD PRESSURE: 67 MMHG | TEMPERATURE: 98.7 F | SYSTOLIC BLOOD PRESSURE: 116 MMHG | WEIGHT: 172 LBS | OXYGEN SATURATION: 95 % | BODY MASS INDEX: 21.39 KG/M2 | RESPIRATION RATE: 16 BRPM | HEART RATE: 88 BPM

## 2021-06-19 DIAGNOSIS — F23 ACUTE PSYCHOSIS (HCC): ICD-10-CM

## 2021-06-19 LAB
AMPHET UR QL SCN: NEGATIVE
BARBITURATES UR QL SCN: NEGATIVE
BENZODIAZ UR QL SCN: NEGATIVE
BZE UR QL SCN: NEGATIVE
CANNABINOIDS UR QL SCN: NEGATIVE
ETHANOL BLD-MCNC: <10.1 MG/DL (ref 0–10)
METHADONE UR QL SCN: NEGATIVE
OPIATES UR QL SCN: NEGATIVE
OXYCODONE UR QL SCN: NEGATIVE
PCP UR QL SCN: NEGATIVE
PROPOXYPH UR QL SCN: NEGATIVE

## 2021-06-19 PROCEDURE — 80307 DRUG TEST PRSMV CHEM ANLYZR: CPT

## 2021-06-19 PROCEDURE — 305215 HCHG ALERT TEAM TELEMED CVMC ONLY

## 2021-06-19 PROCEDURE — 82077 ASSAY SPEC XCP UR&BREATH IA: CPT

## 2021-06-19 PROCEDURE — 99285 EMERGENCY DEPT VISIT HI MDM: CPT

## 2021-06-19 PROCEDURE — 90791 PSYCH DIAGNOSTIC EVALUATION: CPT

## 2021-06-19 RX ORDER — HALOPERIDOL 5 MG/1
5 TABLET ORAL ONCE
Status: DISCONTINUED | OUTPATIENT
Start: 2021-06-19 | End: 2021-06-20 | Stop reason: HOSPADM

## 2021-06-19 ASSESSMENT — LIFESTYLE VARIABLES
HAVE PEOPLE ANNOYED YOU BY CRITICIZING YOUR DRINKING: NO
TOTAL SCORE: 0
CONSUMPTION TOTAL: INCOMPLETE
TOTAL SCORE: 0
EVER FELT BAD OR GUILTY ABOUT YOUR DRINKING: NO
HAVE YOU EVER FELT YOU SHOULD CUT DOWN ON YOUR DRINKING: NO
DO YOU DRINK ALCOHOL: NO
DOES PATIENT WANT TO STOP DRINKING: NO
TOTAL SCORE: 0
EVER HAD A DRINK FIRST THING IN THE MORNING TO STEADY YOUR NERVES TO GET RID OF A HANGOVER: NO

## 2021-06-19 ASSESSMENT — FIBROSIS 4 INDEX: FIB4 SCORE: 1.26
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
## 2021-06-19 NOTE — ED NOTES
Pt became agitated when told he will be transferred to mental health facility. Pt became verbally combative with staff.

## 2021-06-19 NOTE — ED PROVIDER NOTES
ED Provider  Scribed for Remi Gonzalez D.O. by Syed Deleon. 6/19/2021  3:05 PM    Means of arrival:EMS  History obtained from:Patient  History limited by: None    CHIEF COMPLAINT  Chief Complaint   Patient presents with    Psychiatric Evaluation     c/o hx of bipolar, off medication and placed on a legal hold by MOST team. pt was laying on the groundlast night in the front yard eating plants. hx of psycotic behavior        HPI  Moy Enamorado is a 46 y.o. male with a history of bipolar disorder and meth amphetamine abuse who presents to the [facility] ED via ambulance for a pyschiatric evaluation onset today. EMS states the patient has been off of his medication and was placed on a legal 2000. Patient was found in his yard eating vegetation earlier today when EMS was called. Per EMS the patient is paranoid and claims his wife and daughter were kidnapped by the Moonies. Upon arrival to the ED patient claims he does not know why he was brought in. He states he was sitting at home and watching television when the paramedics and police entered his home and transported him to the [facility] ED. He reports frustration over not knowing his wife's amaz[employer] password and wanting to watch Pulp Fiction. No alleviating or exacerbating factors were noted. Patient has associated agitation, but denies suicidal ideation or homicidal ideation. He has no known allergies and takes Albuterol. He does not smoke, but drink alcohol and uses methamphetamine. His PCP is Dr. Contreras.     Patient's past medical records were reviewed which show the patient was seen in the [facility] ED on 6/16/21 for methamphetamine abuse.     REVIEW OF SYSTEMS  See HPI for further details.    PAST MEDICAL HISTORY   has a past medical history of Asthma, Bipolar 1 disorder (HCC), and Migraine.    SOCIAL HISTORY  Social History     Tobacco Use    Smoking status: Never Smoker    Smokeless tobacco: Never Used   Vaping Use    Vaping Use: Never used   Substance and Sexual Activity  "   Alcohol use: Yes     Alcohol/week: 3.0 oz     Types: 5 Cans of beer per week    Drug use: No    Sexual activity: Yes     Partners: Female       SURGICAL HISTORY   has a past surgical history that includes nasal reconstruction (1990).    CURRENT MEDICATIONS  Home Medications       Reviewed by Dottie Wilkins R.N. (Registered Nurse) on 06/19/21 at 1447  Med List Status: Not Addressed     Medication Last Dose Status   albuterol (PROAIR HFA) 108 (90 Base) MCG/ACT Aero Soln inhalation aerosol  Active                    ALLERGIES  No Known Allergies    PHYSICAL EXAM  VITAL SIGNS: /82   Pulse 89   Temp 37.2 °C (99 °F) (Oral)   Resp 16   Ht 1.905 m (6' 3\")   Wt 78 kg (172 lb)   SpO2 95%   BMI 21.50 kg/m²   Constitutional: Well developed, Well nourished, No acute distress, Non-toxic appearance.   HENT: Normocephalic, Atraumatic, Oropharynx moist.   Eyes: PERRLA, EOMI, Conjunctiva normal, No discharge.   Neck: No tenderness, Supple,   Lymphatic: No lymphadenopathy noted.   Cardiovascular: Normal heart rate, Normal rhythm.   Thorax & Lungs: Clear to auscultation bilaterally, No respiratory distress, No wheezing, No crackles.   Abdomen: Soft, No tenderness, No masses.   Skin: Warm, Dry, No rash.   Extremities: Capillary refill less than 2 seconds, No tenderness, No cyanosis.   Musculoskeletal: No tenderness to palpation or major deformities noted.   Neurologic: Awake, alert. Appropriate for age. Normal tone.    Psych: No suicidal ideation or homicidal ideation, but patient's story does not confirm     MEDICAL DECISION MAKING  This is a 46 y.o. male who presents with delusions, paranoia, and history of drug use.  The patient was seen lying in the grass eating foliage.  He is not able to care for himself.  He has no insight into his psychosis.  His alcohol level was negative legal 2000 was completed by me    DIAGNOSTIC STUDIES / PROCEDURES    LABS  Results for orders placed or performed during the hospital " encounter of 06/19/21   URINE DRUG SCREEN   Result Value Ref Range    Amphetamines Urine Negative Negative    Barbiturates Negative Negative    Benzodiazepines Negative Negative    Cocaine Metabolite Negative Negative    Methadone Negative Negative    Opiates Negative Negative    Oxycodone Negative Negative    Phencyclidine -Pcp Negative Negative    Propoxyphene Negative Negative    Cannabinoid Metab Negative Negative   DIAGNOSTIC ALCOHOL   Result Value Ref Range    Diagnostic Alcohol <10.1 0.0 - 10.0 mg/dL         COURSE  Pertinent Labs & Imaging studies reviewed. (See chart for details)    3:05 PM - Patient seen and examined at bedside. Discussed plan of care, including ordering labs to evaluate the patient. Patient agrees to the plan of care. Ordered for Diagnostic Alcohol and Urine Drug Screen to evaluate his symptoms.     4:41 PM Patient will be treated with 5 mg Haldol     4:42 PM I discussed the patient's case and the above findings with Alert Team who states the patient will be treated with 5 mg Haldol to control his agitation. Per Alter Team's, the patient is delusional and should continue to have a legal hold placed on him. Patient will be transferred due to his psychiatric condition.     DISPOSITION:  Patient will be placed on a legal hold and transferred.     FINAL IMPRESSION  1. Acute psychosis (HCC)         Syed MENA (Scribe), am scribing for, and in the presence of, Remi Gonzalez D.O..    Electronically signed by: Syed Deleon (Ezekielibe), 6/19/2021    Remi MENA D.O. personally performed the services described in this documentation, as scribed by Syed Deleon in my presence, and it is both accurate and complete. E    The note accurately reflects work and decisions made by me.  Remi Gonzalez D.O.  6/19/2021  7:29 PM

## 2021-06-19 NOTE — ED TRIAGE NOTES
..  Chief Complaint   Patient presents with   • Psychiatric Evaluation     c/o hx of bipolar, off medication and placed on a legal hold by MOST team. pt was laying on the groundlast night in the front yard eating plants. hx of psycotic behavior      Pt hypersexual and denying POC breathalyzer

## 2021-06-20 NOTE — DISCHARGE PLANNING
"[facility] is here to transport pt to [facility]. Pt has pointed out several small cut on feet including one that looks slighlty red. Neosporn & banaids applied. Pt left walking w/EMT \"Bye. It's been real.\"  "

## 2021-06-20 NOTE — ED NOTES
Report given to ILA Bello at [facility]. RN aware report was called prior.    Patient off unit with EMS. Report to EMS. All belongings with patient. Patient alert and oriented. VSS.

## 2021-06-20 NOTE — ED NOTES
Report received from ILA Sinclair. POC discussed.     1:1 sitter continues to be at bedside. Legal hold precautions continue to be in place.

## 2021-06-20 NOTE — DISCHARGE PLANNING
Medical Social Work     Pt has been accepted to [facility] by Dr. Boudreaux.  Jed called [facility] and set up transport for 2115.  Sw updated  bedside rn, and completed transfer packet.

## 2021-06-20 NOTE — ED NOTES
Vital signs taken. VSS. Charge / Supervisor RN aware of need for sitter. No sitter available at this time.

## 2021-06-20 NOTE — DISCHARGE PLANNING
Medical Social Work    Referral: Legal Hold    Intervention: Legal Hold Paperwork given to SW by Life Skills RN Marisa    Legal Hold Initiated: Date: 6/19/21 Time: 1406    Patient’s Insurance Listed on Face Sheet: [employer]    Referrals sent to: [facility] and [facility]     This referral contains the following information:  1) Face sheet _x___  2) Page 1 and Page 2 of Legal Hold _x___  3) Alert Team Assessment/Psych Assessment _x___  4) Head to toe physical exam _x___  5) Urine Drug Screen __x__  6) Blood Alcohol __x__  7) Vital signs __x__  8) Pregnancy test when applicable _na__  9) Medications list __x__    Plan: Patient will transfer to mental health facility once acceptance is obtained

## 2021-06-20 NOTE — DISCHARGE PLANNING
Writer has spoken multiple times to sister Shital Enamorado. Writer met in soriano by ER waitingroom with Nehal, wife and a male friend of patient. The family requests pt be transported to [facility].  The pt is now calm and cooperative. He is lying on gurney with food tray on his lap. He asks to use BR appropriately. No DTS No SI. Maury has been d/alejandra  Discussed with RN.

## 2021-06-20 NOTE — ED NOTES
Med rec  Updated and complete. Allergies reviewed.  Pt denies taking medications.      Home pharmacy [facility] [phone]

## 2021-10-22 ENCOUNTER — APPOINTMENT (OUTPATIENT)
Dept: RESEARCH | Facility: WORKSITE | Age: 46
End: 2021-10-22
Payer: COMMERCIAL

## 2021-11-23 ENCOUNTER — OFFICE VISIT (OUTPATIENT)
Dept: MEDICAL GROUP | Facility: MEDICAL CENTER | Age: 46
End: 2021-11-23
Payer: COMMERCIAL

## 2021-11-23 VITALS
WEIGHT: 176.26 LBS | TEMPERATURE: 98.1 F | OXYGEN SATURATION: 95 % | BODY MASS INDEX: 21.92 KG/M2 | SYSTOLIC BLOOD PRESSURE: 98 MMHG | HEIGHT: 75 IN | DIASTOLIC BLOOD PRESSURE: 60 MMHG | RESPIRATION RATE: 20 BRPM | HEART RATE: 53 BPM

## 2021-11-23 DIAGNOSIS — Z23 NEED FOR VACCINATION: ICD-10-CM

## 2021-11-23 DIAGNOSIS — Z11.3 SCREENING FOR STD (SEXUALLY TRANSMITTED DISEASE): ICD-10-CM

## 2021-11-23 DIAGNOSIS — F31.9 BIPOLAR 1 DISORDER (HCC): ICD-10-CM

## 2021-11-23 DIAGNOSIS — R06.83 SNORING: ICD-10-CM

## 2021-11-23 DIAGNOSIS — Z00.00 HEALTHCARE MAINTENANCE: ICD-10-CM

## 2021-11-23 PROCEDURE — 90686 IIV4 VACC NO PRSV 0.5 ML IM: CPT | Performed by: FAMILY MEDICINE

## 2021-11-23 PROCEDURE — 90472 IMMUNIZATION ADMIN EACH ADD: CPT | Performed by: FAMILY MEDICINE

## 2021-11-23 PROCEDURE — 90471 IMMUNIZATION ADMIN: CPT | Performed by: FAMILY MEDICINE

## 2021-11-23 PROCEDURE — 99214 OFFICE O/P EST MOD 30 MIN: CPT | Mod: 25 | Performed by: FAMILY MEDICINE

## 2021-11-23 PROCEDURE — 90715 TDAP VACCINE 7 YRS/> IM: CPT | Performed by: FAMILY MEDICINE

## 2021-11-23 RX ORDER — PROPRANOLOL HYDROCHLORIDE 10 MG/1
10 TABLET ORAL DAILY
COMMUNITY
Start: 2021-11-02 | End: 2023-02-06

## 2021-11-23 RX ORDER — BUPROPION HYDROCHLORIDE 150 MG/1
1 TABLET ORAL DAILY
COMMUNITY
Start: 2021-11-08 | End: 2023-02-06

## 2021-11-23 RX ORDER — LITHIUM CARBONATE 450 MG
750 TABLET, EXTENDED RELEASE ORAL DAILY
COMMUNITY
Start: 2021-11-08 | End: 2023-02-06

## 2021-11-23 ASSESSMENT — PATIENT HEALTH QUESTIONNAIRE - PHQ9: CLINICAL INTERPRETATION OF PHQ2 SCORE: 0

## 2021-11-23 ASSESSMENT — FIBROSIS 4 INDEX: FIB4 SCORE: 1.26

## 2021-11-23 NOTE — PROGRESS NOTES
"[facility]  Progress Note  Established Patient    Subjective:   Moy Enamorado is a 46 y.o. male here today with a chief complaint of snoring. The patient is alone.     Snoring  Patient describes longstanding snoring with possible witnessed apnea but no shortness of breath.    Bipolar 1 disorder (HCC)  The patient has bipolar and sees Dr. King (psychiatry).  He is maintained on Wellbutrin, lithium and propranolol.  In the summer he had a manic episode necessitating hospitalization.  During this period he had an affair and is requesting STD testing.      Current Outpatient Medications on File Prior to Visit   Medication Sig Dispense Refill   • buPROPion (WELLBUTRIN XL) 300 MG XL tablet Take 1 Tablet by mouth every day.     • propranolol (INDERAL) 10 MG Tab Take 3 Tablets by mouth every day.     • lithium carbonate  MG Tab CR tablet Take 750 mg by mouth every day.       No current facility-administered medications on file prior to visit.          Objective:     Vitals:    11/23/21 1308   BP: (!) 98/60   BP Location: Left arm   Patient Position: Sitting   BP Cuff Size: Adult long   Pulse: (!) 53   Resp: 20   Temp: 36.7 °C (98.1 °F)   TempSrc: Temporal   SpO2: 95%   Weight: 79.9 kg (176 lb 4.1 oz)   Height: 1.905 m (6' 3\")       Physical Exam:  General: alert in no apparent distress.         Assessment and Plan:     1. Need for vaccination  - INFLUENZA VACCINE QUAD INJ (PF)  - TDAP VACCINE =>6YO IM    2. Healthcare maintenance  - CHLAMYDIA/GC PCR URINE OR SWAB; Future  - HIV AG/AB COMBO ASSAY SCREENING; Future  - RPR (SYPHILIS); Future  - Comp Metabolic Panel; Future  - Lipid Profile; Future  - TSH WITH REFLEX TO FT4; Future    3. Screening for STD (sexually transmitted disease)  - CHLAMYDIA/GC PCR URINE OR SWAB; Future  - HIV AG/AB COMBO ASSAY SCREENING; Future  - RPR (SYPHILIS); Future    4. Bipolar 1 disorder (HCC)  - f/u psychiatry.   - LITHIUM; Future    5. Snoring  - Referral to Pulmonary and " Sleep Medicine        Followup: Return if symptoms worsen or fail to improve.
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
## 2021-11-24 ENCOUNTER — TELEPHONE (OUTPATIENT)
Dept: MEDICAL GROUP | Facility: MEDICAL CENTER | Age: 46
End: 2021-11-24

## 2021-11-24 NOTE — TELEPHONE ENCOUNTER
Patient didn't read [employer] message. Please relay the message below to this patient.     Gerardo Hewitt,     It was good seeing you today.  I noticed after you left that you are heart rate and blood pressure is quite low.  This is likely partially due to the propranolol.  I recommend you speak with your psychiatrist to see if reducing the dose or coming off this medication would be appropriate.     Are you having any dizziness/lightheadedness.     Hope you're doing well.   BH

## 2021-11-24 NOTE — TELEPHONE ENCOUNTER
Phone Number Called: [phone] (home)     Call outcome: LM to pt informing that Dr. Contreras sent a mess via my chart. Pt. to read his mess and respond or may also give us a call.

## 2021-11-29 ENCOUNTER — HOSPITAL ENCOUNTER (OUTPATIENT)
Dept: LAB | Facility: MEDICAL CENTER | Age: 46
End: 2021-11-29
Attending: FAMILY MEDICINE
Payer: COMMERCIAL

## 2021-11-29 DIAGNOSIS — Z11.3 SCREENING FOR STD (SEXUALLY TRANSMITTED DISEASE): ICD-10-CM

## 2021-11-29 DIAGNOSIS — F31.9 BIPOLAR 1 DISORDER (HCC): ICD-10-CM

## 2021-11-29 DIAGNOSIS — Z00.00 HEALTHCARE MAINTENANCE: ICD-10-CM

## 2021-11-29 LAB
ALBUMIN SERPL BCP-MCNC: 4.8 G/DL (ref 3.2–4.9)
ALBUMIN/GLOB SERPL: 2.2 G/DL
ALP SERPL-CCNC: 86 U/L (ref 30–99)
ALT SERPL-CCNC: 32 U/L (ref 2–50)
ANION GAP SERPL CALC-SCNC: 9 MMOL/L (ref 7–16)
AST SERPL-CCNC: 15 U/L (ref 12–45)
BILIRUB SERPL-MCNC: 0.4 MG/DL (ref 0.1–1.5)
BUN SERPL-MCNC: 14 MG/DL (ref 8–22)
CALCIUM SERPL-MCNC: 9.6 MG/DL (ref 8.5–10.5)
CHLORIDE SERPL-SCNC: 104 MMOL/L (ref 96–112)
CHOLEST SERPL-MCNC: 219 MG/DL (ref 100–199)
CO2 SERPL-SCNC: 27 MMOL/L (ref 20–33)
CREAT SERPL-MCNC: 1.05 MG/DL (ref 0.5–1.4)
GLOBULIN SER CALC-MCNC: 2.2 G/DL (ref 1.9–3.5)
GLUCOSE SERPL-MCNC: 107 MG/DL (ref 65–99)
HDLC SERPL-MCNC: 45 MG/DL
HIV 1+2 AB+HIV1 P24 AG SERPL QL IA: NORMAL
LDLC SERPL CALC-MCNC: 155 MG/DL
LITHIUM SERPL-MCNC: 0.5 MMOL/L (ref 0.6–1.2)
POTASSIUM SERPL-SCNC: 4.4 MMOL/L (ref 3.6–5.5)
PROT SERPL-MCNC: 7 G/DL (ref 6–8.2)
SODIUM SERPL-SCNC: 140 MMOL/L (ref 135–145)
TREPONEMA PALLIDUM IGG+IGM AB [PRESENCE] IN SERUM OR PLASMA BY IMMUNOASSAY: NORMAL
TRIGL SERPL-MCNC: 97 MG/DL (ref 0–149)
TSH SERPL DL<=0.005 MIU/L-ACNC: 1.23 UIU/ML (ref 0.38–5.33)

## 2021-11-29 PROCEDURE — 84443 ASSAY THYROID STIM HORMONE: CPT

## 2021-11-29 PROCEDURE — 86780 TREPONEMA PALLIDUM: CPT

## 2021-11-29 PROCEDURE — 87591 N.GONORRHOEAE DNA AMP PROB: CPT

## 2021-11-29 PROCEDURE — 87491 CHLMYD TRACH DNA AMP PROBE: CPT

## 2021-11-29 PROCEDURE — 36415 COLL VENOUS BLD VENIPUNCTURE: CPT

## 2021-11-29 PROCEDURE — 87389 HIV-1 AG W/HIV-1&-2 AB AG IA: CPT

## 2021-11-29 PROCEDURE — 80053 COMPREHEN METABOLIC PANEL: CPT

## 2021-11-29 PROCEDURE — 80061 LIPID PANEL: CPT

## 2021-11-29 PROCEDURE — 80178 ASSAY OF LITHIUM: CPT

## 2021-11-30 LAB
C TRACH DNA SPEC QL NAA+PROBE: NEGATIVE
N GONORRHOEA DNA SPEC QL NAA+PROBE: NEGATIVE
SPECIMEN SOURCE: NORMAL

## 2021-12-23 ENCOUNTER — OFFICE VISIT (OUTPATIENT)
Dept: MEDICAL GROUP | Facility: MEDICAL CENTER | Age: 46
End: 2021-12-23
Payer: COMMERCIAL

## 2021-12-23 VITALS
BODY MASS INDEX: 22.21 KG/M2 | WEIGHT: 178.6 LBS | OXYGEN SATURATION: 96 % | TEMPERATURE: 98.4 F | HEART RATE: 60 BPM | RESPIRATION RATE: 16 BRPM | HEIGHT: 75 IN | SYSTOLIC BLOOD PRESSURE: 98 MMHG | DIASTOLIC BLOOD PRESSURE: 60 MMHG

## 2021-12-23 DIAGNOSIS — M25.571 CHRONIC PAIN OF RIGHT ANKLE: ICD-10-CM

## 2021-12-23 DIAGNOSIS — E78.5 DYSLIPIDEMIA: ICD-10-CM

## 2021-12-23 DIAGNOSIS — F31.9 BIPOLAR 1 DISORDER (HCC): ICD-10-CM

## 2021-12-23 DIAGNOSIS — G89.29 CHRONIC PAIN OF RIGHT ANKLE: ICD-10-CM

## 2021-12-23 DIAGNOSIS — R73.01 IMPAIRED FASTING GLUCOSE: ICD-10-CM

## 2021-12-23 DIAGNOSIS — M54.50 ACUTE MIDLINE LOW BACK PAIN WITHOUT SCIATICA: ICD-10-CM

## 2021-12-23 PROCEDURE — 99214 OFFICE O/P EST MOD 30 MIN: CPT | Performed by: FAMILY MEDICINE

## 2021-12-23 ASSESSMENT — FIBROSIS 4 INDEX: FIB4 SCORE: 0.67

## 2021-12-24 NOTE — ASSESSMENT & PLAN NOTE
The patient describes at least 2 months of R lateral ankle pain without trauma or injury. Worse going down stairs first thing in the morning.

## 2021-12-24 NOTE — ASSESSMENT & PLAN NOTE
Currently following with psychiatry.  Lithium levels are a bit low but he states that his psychiatrist is transitioning him off of lithium anyway.

## 2021-12-24 NOTE — PROGRESS NOTES
"[facility]  Progress Note  Established Patient    Subjective:   Moy Enamorado is a 46 y.o. male here today with a chief complaint of back pain. The patient is alone.     Dyslipidemia  The 10-year ASCVD risk score (Seamuswilliam ROMAN Jr., et al., 2013) is: 2.1%    Chronic pain of right ankle  The patient describes at least 2 months of R lateral ankle pain without trauma or injury. Worse going down stairs first thing in the morning.     Impaired fasting glucose  Noted on labs.    Bipolar 1 disorder (HCC)  Currently following with psychiatry.  Lithium levels are a bit low but he states that his psychiatrist is transitioning him off of lithium anyway.    Back pain  Patient states that for many years occasionally he will get a 3-day paraspinal pain if he lifts a heavy object such as his child.  This goes away.  He denies associated bowel or bladder incontinence or retention.  He denies perineal anesthesia.  He denies sharp, shooting pain down his leg.  He is currently asymptomatic.      Current Outpatient Medications on File Prior to Visit   Medication Sig Dispense Refill   • buPROPion (WELLBUTRIN XL) 150 MG XL tablet Take 1 Tablet by mouth every day.     • propranolol (INDERAL) 10 MG Tab Take 10 mg by mouth every day.     • lithium carbonate  MG Tab CR tablet Take 750 mg by mouth every day.       No current facility-administered medications on file prior to visit.          Objective:     Vitals:    12/23/21 1531   BP: (!) 98/60   BP Location: Left arm   Patient Position: Sitting   BP Cuff Size: Adult long   Pulse: 60   Resp: 16   Temp: 36.9 °C (98.4 °F)   TempSrc: Temporal   SpO2: 96%   Weight: 81 kg (178 lb 9.6 oz)   Height: 1.905 m (6' 3\")       Physical Exam:  General: alert in no apparent distress.   MSK: No tenderness palpation over the spine.  Gait normal.  Right ankle: No ligamentous tenderness with full range of motion.        Assessment and Plan:     1. Dyslipidemia  Encourage diet and exercise.  - Lipid " Profile; Future    2. Impaired fasting glucose  Encourage diet and exercise.  - Blood Glucose; Future    3. Chronic pain of right ankle  Suspect strain.  Will get x-ray and recommended PT.  Patient would like to avoid PT and so I gave him the home [facility] program.  If no resolution of his pain in 6 weeks, I asked him to let me know.  - DX-ANKLE 3+ VIEWS RIGHT; Future    4. Acute midline low back pain without sciatica  Encouraged twice weekly stretching for the spine as a means of preventing injury.  Handout given.    5. Bipolar 1 disorder (HCC)  -Follow-up psychiatry.    Note: Patient also states that occasionally his heel will get cracked in the dry weather.  I recommend Vaseline to the heel covered by a sock before bed.    Followup: Return if symptoms worsen or fail to improve.

## 2021-12-24 NOTE — ASSESSMENT & PLAN NOTE
Patient states that for many years occasionally he will get a 3-day paraspinal pain if he lifts a heavy object such as his child.  This goes away.  He denies associated bowel or bladder incontinence or retention.  He denies perineal anesthesia.  He denies sharp, shooting pain down his leg.  He is currently asymptomatic.

## 2022-02-02 ENCOUNTER — OFFICE VISIT (OUTPATIENT)
Dept: SLEEP MEDICINE | Facility: MEDICAL CENTER | Age: 47
End: 2022-02-02
Payer: COMMERCIAL

## 2022-02-02 VITALS
BODY MASS INDEX: 24.72 KG/M2 | OXYGEN SATURATION: 97 % | WEIGHT: 182.5 LBS | HEART RATE: 55 BPM | DIASTOLIC BLOOD PRESSURE: 72 MMHG | RESPIRATION RATE: 16 BRPM | HEIGHT: 72 IN | SYSTOLIC BLOOD PRESSURE: 124 MMHG

## 2022-02-02 DIAGNOSIS — G47.33 OSA (OBSTRUCTIVE SLEEP APNEA): ICD-10-CM

## 2022-02-02 PROCEDURE — 99203 OFFICE O/P NEW LOW 30 MIN: CPT | Performed by: FAMILY MEDICINE

## 2022-02-02 RX ORDER — ARIPIPRAZOLE 20 MG/1
TABLET ORAL
COMMUNITY
Start: 2022-01-13 | End: 2023-02-06

## 2022-02-02 ASSESSMENT — PATIENT HEALTH QUESTIONNAIRE - PHQ9
5. POOR APPETITE OR OVEREATING: 0 - NOT AT ALL
CLINICAL INTERPRETATION OF PHQ2 SCORE: 2

## 2022-02-02 ASSESSMENT — FIBROSIS 4 INDEX: FIB4 SCORE: 0.67

## 2022-02-02 NOTE — PROGRESS NOTES
"  [facility]  Consult Note     Date: 2/2/2022 / Time: 2:15 PM    Patient ID:   Name:             Moy Enamorado     YOB: 1975  Age:                 46 y.o.  male   MRN:               [id number]      Thank you for requesting a sleep medicine consultation on Moy Enamorado at the sleep center. He presents today with the chief complaints of snoring, waking up gasping and excessive daytime sleepiness. He is referred by Dr. winn for evaluation and treatment of sleep disorder breathing.     HISTORY OF PRESENT ILLNESS:       At night,  Moy Enamorado goes to bed around 10 pm on weekdays and around 10-11 pm on the weekends. He gets out of bed at 7:30 am on weekdays and at 8:30-9 am on the weekends.  He averages about 8 hrs of sleep on a good night and 6 hrs on a bad night.He falls asleep within 10-60 minutes. He uses olanzapine and melatonin as a sleep aid and for his bipolar disorder.He wakes up about 7 times during the night due to no obvious reason and on average It takes him few mins to 2 hrs to fall back asleep.     He is aware of snoring and gasping/choking in sleep.He denies any symptoms of restless legs syndrome such as an \"urge to move\"  He  legs in the evening or bedtime. He  denies any symptoms of narcolepsy such as sleep paralysis or cataplexy, or any symptoms to suggest parasomnias such as sleep walking or acting out of dreams. He  has not used any medications for the sleep problem.    Overall, he doesnot finds his sleep refreshing. In terms of  excessive daytime sleepiness, he complains of sleepiness while  at work, while reading, watching TV however denies while driving. [place] sleepiness scale score is 7/24.He does take regular naps. The naps are usually 30 min long.He drinks about 1-2 caffeinated beverages per day.      REVIEW OF SYSTEMS:       Constitutional: Denies fevers, Denies weight changes  Eyes: Denies changes in vision, no eye pain  Ears/Nose/Throat/Mouth: Denies nasal " congestion or sore throat   Cardiovascular: Denies chest pain or palpitations   Respiratory: Denies shortness of breath , Denies cough  Gastrointestinal/Hepatic: Denies abdominal pain, nausea  Musculoskeletal/Rheum: Denies  joint pain and swelling   Skin/Breast: Denies rash  Neurological: Denies headache, confusion, memory loss or focal weakness/parasthesias  Psychiatric: denies mood disorder     Comprehensive review of systems form is reviewed with the patient and is attached in the EMR.     PMH:  has a past medical history of Asthma, Bipolar 1 disorder (HCC), Chickenpox, and Migraine. He also has no past medical history of [place] measles.  MEDS:   Current Outpatient Medications:   •  aripiprazole (ABILIFY) 20 MG tablet, , Disp: , Rfl:   •  propranolol (INDERAL) 10 MG Tab, Take 10 mg by mouth every day., Disp: , Rfl:   •  lithium carbonate  MG Tab CR tablet, Take 750 mg by mouth every day., Disp: , Rfl:   •  buPROPion (WELLBUTRIN XL) 150 MG XL tablet, Take 1 Tablet by mouth every day. (Patient not taking: Reported on 2/2/2022), Disp: , Rfl:   ALLERGIES: No Known Allergies  SURGHX:   Past Surgical History:   Procedure Laterality Date   • NASAL RECONSTRUCTION  1990     SOCHX:  reports that he has never smoked. He has never used smokeless tobacco. He reports current alcohol use of about 3.0 oz of alcohol per week. He reports that he does not use drugs.   FH:   Family History   Problem Relation Age of Onset   • Other Mother         Tremor   • Sleep Apnea Neg Hx        Physical Exam:  Vitals/ General Appearance:   Weight/BMI: Body mass index is 24.75 kg/m².  /72 (BP Location: Left arm, Patient Position: Sitting, BP Cuff Size: Adult)   Pulse (!) 55   Resp 16   Ht 1.829 m (6')   Wt 82.8 kg (182 lb 8 oz)   SpO2 97%   Vitals:    02/02/22 1404   BP: 124/72   BP Location: Left arm   Patient Position: Sitting   BP Cuff Size: Adult   Pulse: (!) 55   Resp: 16   SpO2: 97%   Weight: 82.8 kg (182 lb 8 oz)   Height:  1.829 m (6')           Constitutional: Alert, no distress, well-groomed.  Skin: No rashes in visible areas.  Eye: Round. Conjunctiva clear, lids normal. No icterus.   ENMT: Lips pink without lesions, good dentition, moist mucous membranes. Phonation normal.  Neck: No masses, no thyromegaly. Moves freely without pain.  CV: Pulse as reported by patient  Respiratory: Unlabored respiratory effort, no cough or audible wheeze  Psych: Alert and oriented x3, normal affect and mood.   INVESTIGATIONS:       ASSESSMENT AND PLAN     1. He  has symptoms of Obstructive Sleep Apnea (GUILLE). He  has excessive daytime sleepiness that interferes with activites of daily living.The pathophysiology of GUILLE and the increased risk of cardiovascular morbidity from untreated GUILLE is discussed in detail with the patient.We have discussed diagnostic options including in-laboratory, attended polysomnography and home sleep testing. We have also discussed treatment options including airway pressurization, reconstructive otolaryngologic surgery, dental appliances and weight management.       Subsequently,treatment options will be discussed based on the diagnostic study. Meanwhile, He is urged to avoid supine sleep, weight gain and alcoholic beverages since all of these can worsen GUILLE. He is cautioned against drowsy driving. If He feels sleepy while driving, He must pull over for a break/nap, rather than persist on the road, in the interest of He own safety and that of others on the road.    Plan  -  He  will be scheduled for an overnight HST to assess sleep related breathing disorder.    2.Regarding treatment of other past medical problems and general health maintenance,  He is urged to follow up with PCP.

## 2022-03-16 ENCOUNTER — APPOINTMENT (OUTPATIENT)
Dept: SLEEP MEDICINE | Facility: MEDICAL CENTER | Age: 47
End: 2022-03-16
Payer: COMMERCIAL

## 2022-03-16 DIAGNOSIS — G47.33 OSA (OBSTRUCTIVE SLEEP APNEA): ICD-10-CM

## 2022-04-08 LAB
CHOLEST SERPL-MCNC: 241 MG/DL (ref 100–199)
GLUCOSE SERPL-MCNC: 100 MG/DL (ref 65–99)
HDLC SERPL-MCNC: 40 MG/DL
LABORATORY COMMENT REPORT: ABNORMAL
LDLC SERPL CALC-MCNC: 176 MG/DL (ref 0–99)
TRIGL SERPL-MCNC: 138 MG/DL (ref 0–149)
VLDLC SERPL CALC-MCNC: 25 MG/DL (ref 5–40)

## 2022-04-26 ENCOUNTER — HOME STUDY (OUTPATIENT)
Dept: SLEEP MEDICINE | Facility: MEDICAL CENTER | Age: 47
End: 2022-04-26
Payer: COMMERCIAL

## 2022-04-26 PROCEDURE — 95806 SLEEP STUDY UNATT&RESP EFFT: CPT | Performed by: FAMILY MEDICINE

## 2022-04-28 NOTE — PROCEDURES
DIAGNOSTIC HOME SLEEP TEST (HST) REPORT       PATIENT ID:  NAME:  Moy Enamorado  MRN:               [id number]  YOB: 1975  DATE OF STUDY: 4/26/2022      Impression:     This study shows evidence of:     1.  Mild obstructive sleep apnea with  Respiratory Event Index (AGGIE) of 12 per hour and worse in supine sleep with AGGIE at 13.5. These findings are based on the recording time (flow evaluation time). It is not possible with this device to determine a traditional apnea+hypopnea index (AHI) for total sleep time since EEG channels are not available.     2. O2 Sat. sherwin was 75% and mean O2 sat was 93% and baseline O2 at 98%. O2 sat was below 88% for 1% of the flow evaluation time. Oxygen Desaturation (>=3%) Index was elevated at 11.8/hr. AVG HR was 47 BPM.      TECHNICAL DESCRIPTION:  Nokter Device used was a type-III home study device. Home sleep study recording included: Airflow recording by nasal pressure transducer; Respiratory Effort by abdominal Respiratory Bands; O2 by finger oximetry. A position sensor and a snore channel was also used.    Scoring Criteria: A modification of the the AASM Manual for the Scoring of Sleep and Associated Events, 2012, was used.   Obstructive apnea was scored by cessation of airflow for at least 10 seconds with continuing respiratory effort.  Central apnea was scored by cessation of airflow for at least 10 seconds with no effort.  Hypopnea was scored by a 30% or more reduction in airflow for at least 10 seconds accompanied by an arterial oxygen desaturation of 3% or more.  (For Medicare patients, hypopneas were scored by a 30% or more reduction in airflow for at least 10 seconds accompanied by an arterial oxygen saturation of 4% or more, as required by their insurance, CMS.        General sleep summary: . Total recording time is 10 hours and 36 minutes and total flow evaluation time is 10 hours and 15 minutes. The patient spent 8 hours and 41 minutes in the  supine position    Respiratory events:    Apneas: 33 (Obstructive apnea index 0.4 /hr, Central apnea index 2.8 /hr, mixed 0/hour)  Hypopneas: 92    Recommendations:    1. CPAP titration study vs Auto CPAP trial .   2.   In general patients with sleep apnea are advised to avoid alcohol and sedatives and to not operate a motor vehicle while drowsy. In some cases alternative treatment options may prove effective in resolving sleep apnea in these options include upper airway surgery, the use of a dental orthotic or weight loss and positional therapy. Clinical correlation is required.         Helen Pearson MD

## 2022-05-23 ENCOUNTER — TELEMEDICINE (OUTPATIENT)
Dept: SLEEP MEDICINE | Facility: MEDICAL CENTER | Age: 47
End: 2022-05-23
Payer: COMMERCIAL

## 2022-05-23 VITALS — HEIGHT: 72 IN | WEIGHT: 180 LBS | BODY MASS INDEX: 24.38 KG/M2

## 2022-05-23 DIAGNOSIS — G47.33 OSA (OBSTRUCTIVE SLEEP APNEA): ICD-10-CM

## 2022-05-23 PROCEDURE — 99213 OFFICE O/P EST LOW 20 MIN: CPT | Mod: 95 | Performed by: FAMILY MEDICINE

## 2022-05-23 ASSESSMENT — FIBROSIS 4 INDEX: FIB4 SCORE: 0.68

## 2022-05-23 NOTE — PATIENT INSTRUCTIONS
Sleep hygiene (ref: UpToDate)  ?Sleep as long as necessary to feel rested (usually seven to eight hours for adults) and then get out of bed  ?Maintain a regular sleep schedule, particularly a regular wake-up time in the morning  ?Try not to force sleep  ?Avoid caffeinated beverages after lunch  ?Avoid alcohol near bedtime (eg, late afternoon and evening)  ?Avoid smoking or other nicotine intake, particularly during the evening  ?Adjust the bedroom environment as needed to decrease stimuli (eg, reduce ambient light, turn off the television or radio)  ?Avoid use of light-emitting screens  (laptops, tablets, smartphones, [employer] Safetyooks) 2 hours before bedtime   ?Resolve concerns or worries before bedtime  ?Exercise regularly for at least 20 minutes, preferably more than four to five hours prior to bedtime.  ?Avoid daytime naps, especially if they are longer than 20 to 30 minutes or occur late in the day    Stimulus control (Ref: UpToDate)    Patients with insomnia may associate their bed and bedroom with the fear of not sleeping or other arousing events, rather than the more pleasurable anticipation of sleep. The longer one stays in bed trying to sleep, the stronger the association becomes. This perpetuates the difficulty falling asleep.Stimulus control therapy is a strategy whose purpose is to disrupt this association by enhancing the likelihood of sleep . Patients should not go to bed until they are sleepy and should use the bed primarily for sleep (and not for reading, watching television, eating, or worrying). They should not spend more than 20 minutes in bed awake. If they are awake after 20 minutes, they should leave the bedroom and engage in a relaxing activity, such as reading or listening to soothing music. Patients should not engage in activities that stimulate them or reward them for being awake in the middle of the night, such as eating or watching television. In addition, they should not return to bed until they  are tired and feel ready to sleep. If they return to bed and still cannot sleep within 20 minutes, the process should be repeated. An alarm should be set to wake the patient at the same time every morning, including weekends. Daytime naps are not allowed.

## 2022-05-23 NOTE — PROGRESS NOTES
Telemedicine Visit: Established Patient   This evaluation was conducted via Zoom using secure and encrypted videoconferencing technology. The patient was in their home in the [facility].    The patient's identity was confirmed and verbal consent was obtained for this virtual visit.     [facility] Follow Up Note     Date: 5/23/2022 / Time: 10:45 AM    Patient ID:   Name:             Moy Enamorado     YOB: 1975  Age:                 47 y.o.  male   MRN:               [id number]      Thank you for requesting a sleep medicine consultation on Moy Enamorado at the sleep center. He presents today with the chief complaints of GUILLE follow up.     HISTORY OF PRESENT ILLNESS:       Pt is currently not on therapy. He goes to sleep around 10 pm on weekdays and around 10-11 pm on the weekends. He gets out of bed at 7:30 am on weekdays and at 8:30-9 am on the weekends.  He averages about 8 hrs of sleep on a good night and 6 hrs on a bad night.He falls asleep within 10-60 minutes. Overall, he doesnot doesnot finds his sleep refreshing. He denies any symptoms of RLS, narcolepsy or any symptoms to suggest parasomnias such as nightmares, sleep walking or acting out of dreams. The symptoms of snoring, waking up gasping and excessive daytime sleepiness.    Since his last visit he had a HST which showed Mild obstructive sleep apnea with  Respiratory Event Index (AGGIE) of 12 per hour and worse in supine sleep with AGGIE at 13.5. T O2 Sat. sherwin was 75% and mean O2 sat was 93% and baseline O2 at 98%. O2 sat was below 88% for 1% of the flow evaluation time. Oxygen Desaturation (>=3%) Index was elevated at 11.8/hr. AVG HR was 47 BPM.      REVIEW OF SYSTEMS:       Constitutional: Denies fevers, Denies weight changes  Eyes: Denies changes in vision, no eye pain  Ears/Nose/Throat/Mouth: Denies nasal congestion or sore throat   Cardiovascular: Denies chest pain or palpitations   Respiratory: Denies shortness of  breath , Denies cough  Gastrointestinal/Hepatic: Denies abdominal pain, nausea,   Musculoskeletal/Rheum: Denies  joint pain and swelling   Skin/Breast: Denies rash,   Neurological: Denies headache, confusion, memory loss or focal weakness/parasthesias  Psychiatric: denies mood disorder   Sleep: + snoring     Comprehensive review of systems form is reviewed with the patient and is attached in the EMR.     PMH:  has a past medical history of Asthma, Bipolar 1 disorder (HCC), Chickenpox, and Migraine.    He has no past medical history of [place] measles.  MEDS:   Current Outpatient Medications:   •  propranolol (INDERAL) 10 MG Tab, Take 10 mg by mouth every day., Disp: , Rfl:   •  aripiprazole (ABILIFY) 20 MG tablet, , Disp: , Rfl:   •  buPROPion (WELLBUTRIN XL) 150 MG XL tablet, Take 1 Tablet by mouth every day. (Patient not taking: Reported on 2/2/2022), Disp: , Rfl:   •  lithium carbonate  MG Tab CR tablet, Take 750 mg by mouth every day., Disp: , Rfl:   ALLERGIES: No Known Allergies  SURGHX:   Past Surgical History:   Procedure Laterality Date   • NASAL RECONSTRUCTION  1990     SOCHX:  reports that he has never smoked. He has never used smokeless tobacco. He reports current alcohol use of about 3.0 oz of alcohol per week. He reports that he does not use drugs..  FH:   Family History   Problem Relation Age of Onset   • Other Mother         Tremor   • Sleep Apnea Neg Hx          Physical Exam:  Vitals/ General Appearance:   Weight/BMI: Body mass index is 24.41 kg/m².  Wt 81.6 kg (180 lb)   Vitals:    05/23/22 1045   Weight: 81.6 kg (180 lb)       Pt. is alert and oriented to time, place and person. Cooperative and in no apparent distress.   Constitutional: Alert, no distress, well-groomed.  Skin: No rashes in visible areas.  Eye: Round. Conjunctiva clear, lids normal. No icterus.   ENMT: Lips pink without lesions, good dentition, moist mucous membranes. Phonation normal.  Neck: No masses, no thyromegaly. Moves  freely without pain.  CV: Pulse as reported by patient  Respiratory: Unlabored respiratory effort, no cough or audible wheeze  Psych: Alert and oriented x3, normal affect and mood.     ASSESSMENT AND PLAN     1.Obstructive Sleep Apnea  The pathophysiology of sleep anea and the increased risk of cardiovascular morbidity from untreated sleep apnea is discussed in detail with the patient. He is urged to avoid supine sleep, weight gain and alcoholic beverages since all of these can worsen sleep apnea. He is cautioned against drowsy driving. If He feels sleepy while driving, He must pull over for a break/nap, rather than persist on the road, in the interest of He own safety and that of others on the road.   Plan   - Auto CPAP vs overnight CPAP titration vs dental appliance and surgeries was dicussed in detail. After informed discussion ACPAP 5-15 cm    - F/u in 8-10 weeks to assess the efficiacy of recommended pressure    - HST was reviewed and discussed with the pt   - compliance was reinforced     2. Regarding treatment of other past medical problems and general health maintenance,  He is urged to follow up with PCP.

## 2022-10-20 ENCOUNTER — OFFICE VISIT (OUTPATIENT)
Dept: SLEEP MEDICINE | Facility: MEDICAL CENTER | Age: 47
End: 2022-10-20
Payer: COMMERCIAL

## 2022-10-20 VITALS
RESPIRATION RATE: 16 BRPM | WEIGHT: 169.8 LBS | HEIGHT: 72 IN | OXYGEN SATURATION: 99 % | BODY MASS INDEX: 23 KG/M2 | HEART RATE: 54 BPM | DIASTOLIC BLOOD PRESSURE: 60 MMHG | SYSTOLIC BLOOD PRESSURE: 100 MMHG

## 2022-10-20 DIAGNOSIS — G47.33 OSA ON CPAP: ICD-10-CM

## 2022-10-20 PROCEDURE — 99214 OFFICE O/P EST MOD 30 MIN: CPT | Performed by: PREVENTIVE MEDICINE

## 2022-10-20 ASSESSMENT — FIBROSIS 4 INDEX: FIB4 SCORE: 0.68

## 2022-10-20 NOTE — PROGRESS NOTES
CHIEF COMPLAINT: Compliance check  LAST SEEN: Dr. Pearson on 05/23/22  HISTORY OF PRESENT ILLNESS:  Moy Enamorado is a 47 y.o.male   who is here today to follow-up. He has a past medical history of Asthma, Bipolar 1 disorder (HCC), Chickenpox, and Migraine.    COMPLIANCE DATA: 73% greater than 4 hours   Machine type:  AirSense 10 Autoset   Date range: 09/21/22-10/20/22  AHI:4.8  TIME USED: 6hrs 40 minutes   PRESSURE SETTINGS:  min 5 max 15  LEAK: excessive 46.1 L per minute     This patient is using PAP therapy consistently and is benefiting from it tremendously.     The patient reports improved symptoms using positive airway pressure. Has experienced no significant problems with mask fit, pressure tolerance, excessive airway dryness, nasal congestion, aerophagia, or chest pain.       Significant comorbidities and modifying factors: see HPI  PROBLEM LIST:  Patient Active Problem List    Diagnosis Date Noted    Dyslipidemia 12/23/2021    Impaired fasting glucose 12/23/2021    Chronic pain of right ankle 12/23/2021    Bipolar 1 disorder (HCC) 11/23/2021    Snoring 11/23/2021    Hemorrhoidal skin tag 11/09/2020    [employer] ancestry requiring population-specific genetic screening 11/09/2020    Hearing decreased 11/09/2020    Back pain 11/09/2020    Carpal tunnel syndrome of right wrist 11/09/2020    Lymphadenopathy 03/18/2020    Cerumen impaction 03/18/2020    Cough 05/06/2019    Ingrown toenail of left foot 12/18/2018    Onychomycosis 12/18/2018    Healthcare maintenance 11/16/2017     PAST MEDICAL HISTORY:  Past Medical History:   Diagnosis Date    Asthma     Childhood    Bipolar 1 disorder (HCC)     Chickenpox     Migraine       PAST SOCIAL HISTORY:  Past Surgical History:   Procedure Laterality Date    NASAL RECONSTRUCTION  1990     PAST FAMILY HISTORY:  Family History   Problem Relation Age of Onset    Other Mother         Tremor    Sleep Apnea Neg Hx      SOCIAL HISTORY:  Social History     Socioeconomic  "History    Marital status:      Spouse name: Not on file    Number of children: Not on file    Years of education: Not on file    Highest education level: Not on file   Occupational History    Not on file   Tobacco Use    Smoking status: Never    Smokeless tobacco: Never   Vaping Use    Vaping Use: Never used   Substance and Sexual Activity    Alcohol use: Yes     Alcohol/week: 3.0 oz     Types: 5 Cans of beer per week     Comment: 0-1 drinks a week     Drug use: No    Sexual activity: Yes     Partners: Female   Other Topics Concern    Not on file   Social History Narrative    Not on file     Social Determinants of Health     Financial Resource Strain: Not on file   Food Insecurity: Not on file   Transportation Needs: Not on file   Physical Activity: Not on file   Stress: Not on file   Social Connections: Not on file   Intimate Partner Violence: Not on file   Housing Stability: Not on file     ALLERGIES: Patient has no known allergies.  MEDICATIONS:  Current Outpatient Medications   Medication Sig Dispense Refill    propranolol (INDERAL) 10 MG Tab Take 10 mg by mouth every day.      COVID-19mRNA Bival Vacc Pfizer (PFIZER COVID-19 VAC BIVALENT) 30 MCG/0.3ML Suspension injection Inject 0.3 mL into the shoulder, thigh, or buttocks. 0.3 mL 0    influenza vaccine quad (FLUZONE QUADRIVALENT) 0.5 ML Suspension Prefilled Syringe injection Inject 0.5 mL into the shoulder, thigh, or buttocks. 0.5 mL 0    aripiprazole (ABILIFY) 20 MG tablet       buPROPion (WELLBUTRIN XL) 150 MG XL tablet Take 1 Tablet by mouth every day. (Patient not taking: Reported on 2/2/2022)      lithium carbonate  MG Tab CR tablet Take 750 mg by mouth every day.       No current facility-administered medications for this visit.    \"CURRENT RX\"    REVIEW OF SYSTEMS:  Constitutional: Denies weight loss  Eyes: Denies vision changes  Ears/Nose/Mouth/Throat: Denies rhinitis/nasal congestion, injury, decayed teeth/toothaches.  Cardiovascular: " Denies chest pain, tightness, palpitations, swelling in legs/feet, difficulty breathing when lying down but gets better when sitting up.   Respiratory: Denies shortness of breath while awake,  Sleep: per HPI  Gastrointestinal: Denies  difficulty swallowing,  heartburn.  Genitourinary: REPORTS nocturia  Musculoskeletal: Denies painful joints, sore muscles, back pain.   Neurological: Denies frequent headaches,weakness, dizziness.    PHYSICAL EXAM/VITALS:  /60 (BP Location: Left arm, Patient Position: Sitting, BP Cuff Size: Adult)   Pulse (!) 54   Resp 16   Ht 1.829 m (6')   Wt 77 kg (169 lb 12.8 oz)   SpO2 99%   BMI 23.03 kg/m²   Appearance: Well-nourished, well-developed,  looks stated age, no acute distress  Eyes:   EOMI  ENMT: MASKED   Neck: Supple, trachea midline  Respiratory effort:  No intercostal retractions or use of accessory muscles  Musculoskeletal:  Grossly normal; gait and station normal  Neurologic:  oriented to person, time, place, and purpose; judgement intact  Psychiatric:  No depression, anxiety, agitation    MEDICAL DECISION MAKING:  The medical record was reviewed in its as pertains to this referral. This includes records from primary care, consultants notes,  referral request, hospital records, labs, imaging. Any available diagnostic and titration nocturnal polysomnograms, home sleep apnea tests, continuous nocturnal oximetry results, multiple sleep latency tests, and compliance reports were reviewed with the patient.    ASSESSMENT/PLAN:  1. GUILLE on CPAP  - DME Mask Fitting; Future  - DME Mask and Supplies  Has been advised to continue the current Pap Therapy.  Also advised to clean equipment frequently, and get new mask and supplies as allowed by insurance and DME.  Patient was advised to NEVER use  OZONE containing cleaning systems such as So Clean.  The risks of untreated sleep apnea were discussed with the patient at length. Patients with GUILLE are at increased risk of cardiovascular  disease including coronary artery disease, systemic arterial hypertension, pulmonary arterial hypertension, cardiac arrhythmias, and stroke. GUILLE patients have an increased risk of motor vehicle accidents, type 2 diabetes, chronic kidney disease, and non-alcoholic liver disease. The patient was advised to avoid driving a motor vehicle when drowsy.  Have advised the patient to follow up with the appropriate healthcare practitioners for all other medical problems and issues.    RETURN TO CLINIC: Return in about 1 year (around 10/20/2023) for Annual visit.  My total time spent caring for the patient on the day of the encounter was 40minutes. This includes time time spent on a thorough chart review including other physician notes, any type of sleep study, as well as critical labs and pulmonary and cardiac studies.  Additionally it includes discussions of good sleep hygiene, stimulus control and going over the need for consistency in terms of sleep preparation and practice.     Please note that this dictation was created using voice recognition software.  I have made every reasonable attempt to correct obvious errors, I expect that there are errors of grammar and possibly content that I did not discover before finalizing this note.

## 2022-10-25 ENCOUNTER — PHARMACY VISIT (OUTPATIENT)
Dept: PHARMACY | Facility: MEDICAL CENTER | Age: 47
End: 2022-10-25
Payer: COMMERCIAL

## 2022-10-25 PROCEDURE — RXMED WILLOW AMBULATORY MEDICATION CHARGE: Performed by: INTERNAL MEDICINE

## 2022-10-25 RX ORDER — BNT162B2 ORIGINAL AND OMICRON BA.4/BA.5 .1125; .1125 MG/2.25ML; MG/2.25ML
0.3 INJECTION, SUSPENSION INTRAMUSCULAR
Qty: 0.3 ML | Refills: 0 | OUTPATIENT
Start: 2022-10-25 | End: 2022-10-26

## 2022-10-25 RX ORDER — INFLUENZA A VIRUS A/BRISBANE/02/2018 IVR-190 (H1N1) ANTIGEN (FORMALDEHYDE INACTIVATED), INFLUENZA A VIRUS A/KANSAS/14/2017 X-327 (H3N2) ANTIGEN (FORMALDEHYDE INACTIVATED), INFLUENZA B VIRUS B/PHUKET/3073/2013 ANTIGEN (FORMALDEHYDE INACTIVATED), AND INFLUENZA B VIRUS B/MARYLAND/15/2016 BX-69A ANTIGEN (FORMALDEHYDE INACTIVATED) 15; 15; 15; 15 UG/.5ML; UG/.5ML; UG/.5ML; UG/.5ML
0.5 INJECTION, SUSPENSION INTRAMUSCULAR
Qty: 0.5 ML | Refills: 0 | OUTPATIENT
Start: 2022-10-25 | End: 2022-10-26

## 2023-02-06 ENCOUNTER — OFFICE VISIT (OUTPATIENT)
Dept: MEDICAL GROUP | Facility: PHYSICIAN GROUP | Age: 48
End: 2023-02-06
Payer: COMMERCIAL

## 2023-02-06 VITALS
OXYGEN SATURATION: 98 % | SYSTOLIC BLOOD PRESSURE: 118 MMHG | BODY MASS INDEX: 23.4 KG/M2 | TEMPERATURE: 98.4 F | HEART RATE: 56 BPM | HEIGHT: 72 IN | RESPIRATION RATE: 16 BRPM | DIASTOLIC BLOOD PRESSURE: 68 MMHG | WEIGHT: 172.8 LBS

## 2023-02-06 DIAGNOSIS — K64.4 HEMORRHOIDAL SKIN TAG: Chronic | ICD-10-CM

## 2023-02-06 DIAGNOSIS — R73.01 IMPAIRED FASTING GLUCOSE: Chronic | ICD-10-CM

## 2023-02-06 DIAGNOSIS — L30.4 INTERTRIGO: ICD-10-CM

## 2023-02-06 DIAGNOSIS — Z12.11 COLON CANCER SCREENING: ICD-10-CM

## 2023-02-06 DIAGNOSIS — B35.1 ONYCHOMYCOSIS: Chronic | ICD-10-CM

## 2023-02-06 DIAGNOSIS — E78.5 DYSLIPIDEMIA: Chronic | ICD-10-CM

## 2023-02-06 DIAGNOSIS — F31.9 BIPOLAR 1 DISORDER (HCC): Chronic | ICD-10-CM

## 2023-02-06 DIAGNOSIS — Z00.00 WELL ADULT EXAM: ICD-10-CM

## 2023-02-06 DIAGNOSIS — G47.33 OSA (OBSTRUCTIVE SLEEP APNEA): ICD-10-CM

## 2023-02-06 PROBLEM — R59.1 LYMPHADENOPATHY: Status: RESOLVED | Noted: 2020-03-18 | Resolved: 2023-02-06

## 2023-02-06 PROCEDURE — 99205 OFFICE O/P NEW HI 60 MIN: CPT | Performed by: INTERNAL MEDICINE

## 2023-02-06 RX ORDER — NYSTATIN 100000 U/G
CREAM TOPICAL
Qty: 60 G | Refills: 1 | Status: SHIPPED | OUTPATIENT
Start: 2023-02-06 | End: 2024-03-13

## 2023-02-06 RX ORDER — LAMOTRIGINE 150 MG/1
1 TABLET ORAL DAILY
COMMUNITY
Start: 2021-11-01 | End: 2023-10-03

## 2023-02-06 RX ORDER — OLANZAPINE 2.5 MG/1
1 TABLET, FILM COATED ORAL DAILY
COMMUNITY
Start: 2022-12-02

## 2023-02-06 ASSESSMENT — FIBROSIS 4 INDEX: FIB4 SCORE: 0.68

## 2023-02-07 NOTE — ASSESSMENT & PLAN NOTE
Chronic condition affecting both groins  Patient has tried over-the-counter topical cream without improvement.

## 2023-02-07 NOTE — ASSESSMENT & PLAN NOTE
Chronic condition.  Stable.  The patient followed by private psychiatrist.  Patient is being treated with lamotrigine 150 mg daily and Zyprexa 2.5 mg daily.  The patient denies SI.

## 2023-02-07 NOTE — PROGRESS NOTES
PRIMARY CARE CLINIC VISIT    Chief complaint:    Patient here to establish care .  Patient was seen previously by Dr. Contreras    Bipolar disorder  Apnea  Follow-up hyperlipidemia  Follow-up prediabetes  Toenail fungus  Hemorrhoids  Fungal infection groin  Request lab tests      History of Present Illness     Bipolar 1 disorder (HCC)  Chronic condition.  Stable.  The patient followed by private psychiatrist.  Patient is being treated with lamotrigine 150 mg daily and Zyprexa 2.5 mg daily.  The patient denies SI.    Dyslipidemia  Chronic condition.  The patient presently on diet therapy.  Lab tests ordered for follow-up.    Hemorrhoidal skin tag  Chronic condition.  Patient denies GI bleeding.  Patient reported recurrent itching.    Impaired fasting glucose  Chronic ongoing condition.  Patient presently on diet therapy.  Lab tests ordered for follow-up.    Onychomycosis  Chronic condition affecting the toenails.  The patient has try topical treatment without improvement   Patient denies pain or discomfort.    Intertrigo  Chronic condition affecting both groins  Patient has tried over-the-counter topical cream without improvement.    GUILLE (obstructive sleep apnea)  Chronic stable condition.  The patient followed by sleep medicine specialist.  He is using CPAP on nightly basis.  This condition is stable.  No new issues or problems reported.    Current Outpatient Medications on File Prior to Visit   Medication Sig Dispense Refill    lamotrigine (LAMICTAL) 150 MG tablet Take 1 Tablet by mouth every day.      OLANZapine (ZYPREXA) 2.5 MG Tab Take 1 Tablet by mouth every day.       No current facility-administered medications on file prior to visit.        Allergies: Patient has no known allergies.    Current Outpatient Medications Ordered in Epic   Medication Sig Dispense Refill    lamotrigine (LAMICTAL) 150 MG tablet Take 1 Tablet by mouth every day.      OLANZapine (ZYPREXA) 2.5 MG Tab Take 1 Tablet by mouth every day.       hydrocortisone 2.5 % Cream topical cream APPLY to affected hemorrhoids bid prn 28 g 1    nystatin (MYCOSTATIN) 425625 UNIT/GM Cream topical cream Apply to groin bid prn 60 g 1     No current [facility]-ordered facility-administered medications on file.       Past Medical History:   Diagnosis Date    Asthma     Childhood    Bipolar 1 disorder (HCC)     Chickenpox     Migraine        Past Surgical History:   Procedure Laterality Date    NASAL RECONSTRUCTION  1990       Family History   Problem Relation Age of Onset    Other Mother         Tremor    Sleep Apnea Neg Hx        Social History     Tobacco Use   Smoking Status Never   Smokeless Tobacco Never       Social History     Substance and Sexual Activity   Alcohol Use Yes    Alcohol/week: 3.0 oz    Types: 5 Cans of beer per week    Comment: 0-1 drinks a week        Review of systems.  As per HPI above. All other systems reviewed and negative.      Past Medical, Social, and Family history reviewed and updated in EPIC     Objective     /68 (BP Location: Left arm, Patient Position: Sitting, BP Cuff Size: Adult)   Pulse (!) 56   Temp 36.9 °C (98.4 °F) (Temporal)   Resp 16   Ht 1.829 m (6')   Wt 78.4 kg (172 lb 12.8 oz)   SpO2 98%    Body mass index is 23.44 kg/m².    General: alert in no apparent distress.  Cardiovascular: regular rate and rhythm  Pulmonary: lungs : no wheezing   Gastrointestinal: BS present. No obvious mass noted   normal male genitalia.  Groin show moist erythematous rash noted.  Rectal with minimal external hemorrhoid.  No active bleeding.  No other mass noted  Toenail shows thickened dystrophic toenail consistent with onychomycosis      Lab Results   Component Value Date/Time    WBC 8.6 06/16/2021 08:21 PM    HEMOGLOBIN 15.2 06/16/2021 08:21 PM    HEMATOCRIT 46.2 06/16/2021 08:21 PM    MCV 95.9 06/16/2021 08:21 PM    PLATELETCT 186 06/16/2021 08:21 PM         Lab Results   Component Value Date/Time    SODIUM 140 11/29/2021 10:47 AM     POTASSIUM 4.4 11/29/2021 10:47 AM    GLUCOSE 100 (H) 04/07/2022 05:44 AM    GLUCOSE 107 (H) 11/29/2021 10:47 AM    BUN 14 11/29/2021 10:47 AM    CREATININE 1.05 11/29/2021 10:47 AM       Lab Results   Component Value Date/Time    CHOLSTRLTOT 241 (H) 04/07/2022 05:44 AM    CHOLSTRLTOT 219 (H) 11/29/2021 10:47 AM     (H) 11/29/2021 10:47 AM    HDL 40 04/07/2022 05:44 AM    HDL 45 11/29/2021 10:47 AM    TRIGLYCERIDE 138 04/07/2022 05:44 AM    TRIGLYCERIDE 97 11/29/2021 10:47 AM       Lab Results   Component Value Date/Time    ALTSGPT 32 11/29/2021 10:47 AM             Assessment and Plan     1. Bipolar 1 disorder (HCC)  Stable condition.  Advised the patient continue with Lamictal 150 Mg daily and Zyprexa 2.5 mg daily.  To follow-up with psychiatrist as directed.  Advised the patient to continue get lab work monitored by the psychiatrist to monitor the Lamictal level.    2. GUILLE (obstructive sleep apnea)  Chronic stable condition.  Continue with CPAP use nightly.  Continue follow-up with sleep medicine specialist.      3. Dyslipidemia  Chronic condition.  Current control unclear.  Lab tests ordered for follow-up.  Commend low-fat low-cholesterol diet.  Continue to monitor.  - Lipid Profile; Future    4. Impaired fasting glucose  Chronic condition.  Current control is unclear.  Lab tests ordered to include chemistry and A1c.  Recommend low sweet low-carb diet.  Continue to monitor.  - Basic Metabolic Panel; Future  - HEMOGLOBIN A1C; Future    5. Onychomycosis  Chronic condition.  Uncontrolled.  Patient has tried different topical products without improvement.  - Referral to Podiatry    6. Hemorrhoidal skin tag  Chronic stable condition.  Advised the patient to consider using hydrocortisone cream 25 mg twice daily PM.  Recommended to increase in fluid and fiber intake.  Patient denies any history of bleeding.  Recommend sitz bath.    7. Intertrigo  Chronic condition.  Uncontrolled.  The patient to try topical  nystatin cream twice daily.  Follow-up if not better.      - nystatin (MYCOSTATIN) 279824 UNIT/GM Cream topical cream; Apply to groin bid prn  Dispense: 60 g; Refill: 1    8. Well adult exam  Routine labs ordered.  Advised the patient to call for the results after a few days.          Health maintenance.  Refer the patient to GI for screening colonoscopy          Total time: 62  min -  That includes time for chart review before the visit, the actual patient visit, and time spent on documentation in EMR after the visit.  Chart review/prep, review of other providers' records, imaging/lab review, face-to-face time for history/examination, ordering, prescribing,  review of results/meds/ treatment plan with patient, and care coordination.           Please note that this dictation was created using voice recognition software. I have made every reasonable attempt to correct obvious errors, but I expect that there are errors of grammar and possibly content that I did not discover before finalizing the note.    Franki Silveira MD  Internal Medicine  [facility]

## 2023-02-07 NOTE — ASSESSMENT & PLAN NOTE
Chronic stable condition.  The patient followed by sleep medicine specialist.  He is using CPAP on nightly basis.  This condition is stable.  No new issues or problems reported.

## 2023-02-07 NOTE — ASSESSMENT & PLAN NOTE
Chronic condition affecting the toenails.  The patient has try topical treatment without improvement   Patient denies pain or discomfort.

## 2023-02-15 LAB
25(OH)D3+25(OH)D2 SERPL-MCNC: 25.3 NG/ML (ref 30–100)
BUN SERPL-MCNC: 15 MG/DL (ref 6–24)
BUN/CREAT SERPL: 16 (ref 9–20)
CALCIUM SERPL-MCNC: 9.3 MG/DL (ref 8.7–10.2)
CHLORIDE SERPL-SCNC: 104 MMOL/L (ref 96–106)
CHOLEST SERPL-MCNC: 230 MG/DL (ref 100–199)
CO2 SERPL-SCNC: 27 MMOL/L (ref 20–29)
CREAT SERPL-MCNC: 0.93 MG/DL (ref 0.76–1.27)
EGFRCR SERPLBLD CKD-EPI 2021: 101 ML/MIN/1.73
GLUCOSE SERPL-MCNC: 112 MG/DL (ref 70–99)
HBA1C MFR BLD: 5.5 % (ref 4.8–5.6)
HDLC SERPL-MCNC: 38 MG/DL
LABORATORY COMMENT REPORT: ABNORMAL
LDLC SERPL CALC-MCNC: 160 MG/DL (ref 0–99)
POTASSIUM SERPL-SCNC: 4.5 MMOL/L (ref 3.5–5.2)
SODIUM SERPL-SCNC: 143 MMOL/L (ref 134–144)
TRIGL SERPL-MCNC: 176 MG/DL (ref 0–149)
TSH SERPL DL<=0.005 MIU/L-ACNC: 1.13 UIU/ML (ref 0.45–4.5)
VIT B12 SERPL-MCNC: 604 PG/ML (ref 232–1245)
VLDLC SERPL CALC-MCNC: 32 MG/DL (ref 5–40)

## 2023-02-16 PROBLEM — R73.03 PREDIABETES: Status: ACTIVE | Noted: 2021-12-23

## 2023-02-16 PROBLEM — R73.03 PREDIABETES: Chronic | Status: ACTIVE | Noted: 2021-12-23

## 2023-02-16 RX ORDER — ATORVASTATIN CALCIUM 20 MG/1
20 TABLET, FILM COATED ORAL DAILY
Qty: 90 TABLET | Refills: 3 | Status: SHIPPED | OUTPATIENT
Start: 2023-02-16 | End: 2023-02-23

## 2023-02-23 DIAGNOSIS — E78.5 DYSLIPIDEMIA: Chronic | ICD-10-CM

## 2023-05-30 ENCOUNTER — OFFICE VISIT (OUTPATIENT)
Dept: MEDICAL GROUP | Facility: MEDICAL CENTER | Age: 48
End: 2023-05-30
Payer: COMMERCIAL

## 2023-05-30 ENCOUNTER — APPOINTMENT (OUTPATIENT)
Dept: MEDICAL GROUP | Facility: PHYSICIAN GROUP | Age: 48
End: 2023-05-30
Payer: COMMERCIAL

## 2023-05-30 VITALS
TEMPERATURE: 98.2 F | HEIGHT: 72 IN | SYSTOLIC BLOOD PRESSURE: 98 MMHG | WEIGHT: 176.4 LBS | HEART RATE: 54 BPM | BODY MASS INDEX: 23.89 KG/M2 | DIASTOLIC BLOOD PRESSURE: 56 MMHG | OXYGEN SATURATION: 96 %

## 2023-05-30 DIAGNOSIS — E78.5 DYSLIPIDEMIA: Chronic | ICD-10-CM

## 2023-05-30 DIAGNOSIS — Z29.89 NEED FOR MALARIA PROPHYLAXIS: ICD-10-CM

## 2023-05-30 DIAGNOSIS — Z23 NEED FOR VACCINATION: ICD-10-CM

## 2023-05-30 DIAGNOSIS — Z71.84 TRAVEL ADVICE ENCOUNTER: ICD-10-CM

## 2023-05-30 PROBLEM — H61.20 CERUMEN IMPACTION: Status: RESOLVED | Noted: 2020-03-18 | Resolved: 2023-05-30

## 2023-05-30 PROBLEM — L30.4 INTERTRIGO: Status: RESOLVED | Noted: 2023-02-06 | Resolved: 2023-05-30

## 2023-05-30 PROBLEM — M54.9 BACK PAIN: Status: RESOLVED | Noted: 2020-11-09 | Resolved: 2023-05-30

## 2023-05-30 PROBLEM — H91.90 HEARING DECREASED: Status: RESOLVED | Noted: 2020-11-09 | Resolved: 2023-05-30

## 2023-05-30 PROBLEM — R06.83 SNORING: Status: RESOLVED | Noted: 2021-11-23 | Resolved: 2023-05-30

## 2023-05-30 PROBLEM — R05.9 COUGH: Status: RESOLVED | Noted: 2019-05-06 | Resolved: 2023-05-30

## 2023-05-30 PROBLEM — L60.0 INGROWN TOENAIL OF LEFT FOOT: Status: RESOLVED | Noted: 2018-12-18 | Resolved: 2023-05-30

## 2023-05-30 PROBLEM — Z00.00 HEALTHCARE MAINTENANCE: Status: RESOLVED | Noted: 2017-11-16 | Resolved: 2023-05-30

## 2023-05-30 PROBLEM — G56.01 CARPAL TUNNEL SYNDROME OF RIGHT WRIST: Status: RESOLVED | Noted: 2020-11-09 | Resolved: 2023-05-30

## 2023-05-30 PROCEDURE — 3078F DIAST BP <80 MM HG: CPT | Performed by: STUDENT IN AN ORGANIZED HEALTH CARE EDUCATION/TRAINING PROGRAM

## 2023-05-30 PROCEDURE — 3074F SYST BP LT 130 MM HG: CPT | Performed by: STUDENT IN AN ORGANIZED HEALTH CARE EDUCATION/TRAINING PROGRAM

## 2023-05-30 PROCEDURE — 90471 IMMUNIZATION ADMIN: CPT | Performed by: STUDENT IN AN ORGANIZED HEALTH CARE EDUCATION/TRAINING PROGRAM

## 2023-05-30 PROCEDURE — 90472 IMMUNIZATION ADMIN EACH ADD: CPT | Performed by: STUDENT IN AN ORGANIZED HEALTH CARE EDUCATION/TRAINING PROGRAM

## 2023-05-30 PROCEDURE — 99213 OFFICE O/P EST LOW 20 MIN: CPT | Mod: 25 | Performed by: STUDENT IN AN ORGANIZED HEALTH CARE EDUCATION/TRAINING PROGRAM

## 2023-05-30 PROCEDURE — 90632 HEPA VACCINE ADULT IM: CPT | Performed by: STUDENT IN AN ORGANIZED HEALTH CARE EDUCATION/TRAINING PROGRAM

## 2023-05-30 PROCEDURE — 90746 HEPB VACCINE 3 DOSE ADULT IM: CPT | Performed by: STUDENT IN AN ORGANIZED HEALTH CARE EDUCATION/TRAINING PROGRAM

## 2023-05-30 RX ORDER — ATOVAQUONE AND PROGUANIL HYDROCHLORIDE 250; 100 MG/1; MG/1
1 TABLET, FILM COATED ORAL DAILY
Qty: 16 TABLET | Refills: 0 | Status: SHIPPED | OUTPATIENT
Start: 2023-05-30 | End: 2023-06-15

## 2023-05-30 ASSESSMENT — ENCOUNTER SYMPTOMS
DIZZINESS: 0
WHEEZING: 0
CHILLS: 0
HEADACHES: 0
SHORTNESS OF BREATH: 0
VOMITING: 0
PALPITATIONS: 0
FEVER: 0
WEIGHT LOSS: 0
NAUSEA: 0

## 2023-05-30 ASSESSMENT — FIBROSIS 4 INDEX: FIB4 SCORE: 0.7

## 2023-05-30 NOTE — PROGRESS NOTES
Subjective:     CC:     HPI:   Moy presents today with    Problem   Floyd On Cpap    Report adherent       Dyslipidemia    History of elevated cholesterol, reports family history also on olanzapine and lamotrigine       Prediabetes    Controlled with lifestyle       Onychomycosis    Following with podiatry        Travel Advice Encounter    Plan to travel to [facility]  Reviewed travel recommendation from [facility] web [facility].   Hep A and Hep B today.   He has appointment with travel clinic to receive typhoid vaccination and ipv polio booster.  Malarone prophylaxis requested and prescribed, start 1-2 days prior to travel and complete course         Intertrigo (Resolved)   Snoring (Resolved)   Hearing Decreased (Resolved)   Back Pain (Resolved)   Carpal Tunnel Syndrome of Right Wrist (Resolved)   Cerumen Impaction (Resolved)   Cough (Resolved)   Ingrown Toenail of Left Foot (Resolved)           Health Maintenance:     ROS:  Review of Systems   Constitutional:  Negative for chills, fever and weight loss.   HENT:  Negative for hearing loss.    Respiratory:  Negative for shortness of breath and wheezing.    Cardiovascular:  Negative for chest pain and palpitations.   Gastrointestinal:  Negative for nausea and vomiting.   Genitourinary:  Negative for frequency and urgency.   Skin:  Negative for rash.   Neurological:  Negative for dizziness and headaches.       Objective:     Exam:  BP 98/56 (BP Location: Right arm, Patient Position: Sitting, BP Cuff Size: Adult)   Pulse (!) 54   Temp 36.8 °C (98.2 °F) (Temporal)   Ht 1.829 m (6')   Wt 80 kg (176 lb 6.4 oz)   SpO2 96%   BMI 23.92 kg/m²  Body mass index is 23.92 kg/m².    Physical Exam  Constitutional:       Appearance: Normal appearance.   Cardiovascular:      Rate and Rhythm: Normal rate and regular rhythm.   Pulmonary:      Effort: Pulmonary effort is normal.      Breath sounds: Normal breath sounds.   Neurological:      Mental Status: He is alert.         Labs:      Assessment & Plan:     48 y.o. male with the following -     1. Travel advice encounter  Reviewed travel recommendation per [facility] website and provided counseling  Patient has appointment at [facility] travel clinic  Hep a and hep b today ( unknown vaccination status)  Obtain Typhoid vaccine and need for polio booster from travel clinic  Malarone prescribed and reviewed, no interaction with psych meds     2. Dyslipidemia  Chronic, stable  Controlled through life style    3. Need for malaria prophylaxis    - atovaquone-proguanil (MALARONE) 250-100 MG per tablet; Take 1 Tablet by mouth every day for 16 days.  Dispense: 16 Tablet; Refill: 0    4. Need for vaccination    - Hep A Adult 19+  - Hep B Adult 20+    I spend 29 visit review prior pcp notes, and reviewing travel information, from [facility] website and counseling     Return in about 6 months (around 11/30/2023) for routine follow up .    Please note that this dictation was created using voice recognition software. I have made every reasonable attempt to correct obvious errors, but I expect that there are errors of grammar and possibly content that I did not discover before finalizing the note.

## 2023-07-03 ENCOUNTER — NON-PROVIDER VISIT (OUTPATIENT)
Dept: MEDICAL GROUP | Facility: MEDICAL CENTER | Age: 48
End: 2023-07-03
Payer: COMMERCIAL

## 2023-07-03 DIAGNOSIS — Z23 NEED FOR VACCINATION: ICD-10-CM

## 2023-07-03 PROCEDURE — 90471 IMMUNIZATION ADMIN: CPT | Performed by: FAMILY MEDICINE

## 2023-07-03 PROCEDURE — 90746 HEPB VACCINE 3 DOSE ADULT IM: CPT | Performed by: FAMILY MEDICINE

## 2023-07-03 NOTE — PROGRESS NOTES
"Moy Enamorado is a 48 y.o. male here for a non-provider visit for:   HEPATITIS B 2 of 3    Reason for immunization: continue or complete series started at the office  Immunization records indicate need for vaccine: Yes, confirmed with Epic  Minimum interval has been met for this vaccine: Yes  ABN completed: Not Indicated    VIS Dated  5/12/2023 was given to patient: Yes  All IAC Questionnaire questions were answered \"No.\"    Patient tolerated injection and no adverse effects were observed or reported: Yes    Pt scheduled for next dose in series: Not Indicated  "

## 2023-07-31 ENCOUNTER — OFFICE VISIT (OUTPATIENT)
Dept: SLEEP MEDICINE | Facility: MEDICAL CENTER | Age: 48
End: 2023-07-31
Payer: COMMERCIAL

## 2023-07-31 VITALS
HEART RATE: 61 BPM | BODY MASS INDEX: 22.48 KG/M2 | RESPIRATION RATE: 16 BRPM | SYSTOLIC BLOOD PRESSURE: 102 MMHG | WEIGHT: 166 LBS | DIASTOLIC BLOOD PRESSURE: 66 MMHG | OXYGEN SATURATION: 96 % | HEIGHT: 72 IN

## 2023-07-31 DIAGNOSIS — G47.33 OSA ON CPAP: ICD-10-CM

## 2023-07-31 PROCEDURE — 99213 OFFICE O/P EST LOW 20 MIN: CPT

## 2023-07-31 PROCEDURE — 3078F DIAST BP <80 MM HG: CPT

## 2023-07-31 PROCEDURE — 99212 OFFICE O/P EST SF 10 MIN: CPT

## 2023-07-31 PROCEDURE — 3074F SYST BP LT 130 MM HG: CPT

## 2023-07-31 NOTE — ASSESSMENT & PLAN NOTE
Sleep Apnea:    The pathophysiology of sleep anea and the increased risk of cardiovascular morbidity from untreated sleep apnea is discussed in detail with the patient.  Urged to avoid supine sleep, weight gain and alcoholic beverages since all of these can worsen sleep apnea. Cautioned against drowsy driving. If feeling sleepy while driving, pull over for a break/nap, rather than persist on the road, in the interest of own safety and that of others on the road.    Compliance download was reviewed and discussed with the patient.   Patient is concerned because a couple nights this past week his AHI score was elevated at around 10.  His overall AHI score over the past month is 4.0.  He would like to have a soon follow-up for compliance download to ensure his AHI score is well controlled.  He also reports that he is having breakdown over the bridge of the nose to the mask, which I suggested a gel nasal CPAP padding.    - Order placed for mask and supplies   - Compliance was reinforced  - Recommended the patient against the use of Ozone , such as SoClean  - Clean supplies a couple times a week with dish soap and water and air dry  - Recommended the patient change out supplies as recommended for best mask fit and usage of the machine  - Advised patient to reach out via [facility] if any questions or concerns should arise.   - Equipment replacement schedule:  Mask cushion every month  Nasal pillows 2 times per month  Mask every 6 months  Head gear every 6 months  Tubing every 3 months  Ultra-fine filters 2 times per month  Foam filter every 6 months  Humidifier chamber every 6 months    Has been advised to continue the current CPAP, clean equipment frequently, and get new mask and supplies as allowed by insurance and DME. Recommend an earlier appointment, if significant treatment barriers develop.    The risks of untreated sleep apnea were discussed with the patient at length. Patients with sleep apnea are at  increased risk of cardiovascular disease including coronary artery disease, systemic arterial hypertension, pulmonary arterial hypertension, cardiac arrythmias, and stroke. The patient was advised to avoid driving a motor vehicle when drowsy.    Positive airway pressure will favorably impact many of the adverse conditions and effects provoked by sleep apnea.

## 2023-07-31 NOTE — PROGRESS NOTES
[facility] Follow-up Visit    Date of Visit: 7/31/2023     CC:  Follow-up for GUILLE management      HPI:  Moy Enamorado is a very pleasant 48 y.o. year old male never smoker, with a PMHx of GUILLE on CPAP, asthma, bipolar 1 who presented to the [facility] for a regular follow up. Last seen in the office on 10/20/2022 with Dr. Plunkett.     Patient presents for compliance.  Patient states that his sleep is more restful with CPAP usage.  He does state that he has issues falling asleep at night, which he attributes to anxiety.  He also reports a dry mouth, which water alleviates.  He is also experiencing breakdown over the bridge of his nose due to CPAP mask.  He denies any significant morning headaches, daytime/driving drowsy, snoring, gasping, apneas, palpitations, aerophagia, significant mask leak.  He goes to bed at 11 PM and wakes up at 8 AM.  He will have 1 awakening at night and will rarely have any issues falling back asleep.  He does not nap.    DME provider: [facility] [employer]  Device: Intellisense AirSense 10  Settings: AutoCPAP 5-15 CWP  When: October 2022  Mask: FFM  Chin strap: No     Cleaning regimen: Rarely    Compliance:  Compliance data reviewed showing 73% usage > 4hours in last 30 days. Average AHI 4.0 events/hour. 95% pressure 13.0 CWP. 95% leaks 15.9 L/min. Patient continues to use and benefit from machine.      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 11/09/2020    Onychomycosis 12/18/2018    Travel advice encounter 11/16/2017     Past Medical History:   Diagnosis Date    Asthma     Childhood    Bipolar 1 disorder (HCC)     Chickenpox     Migraine       Past Surgical History:   Procedure  Laterality Date    NASAL RECONSTRUCTION  1990     Family History   Problem Relation Age of Onset    Other Mother         Tremor    Parkinson's Disease Maternal Grandmother     Cancer Maternal Grandfather         cancer    Heart Disease Paternal Grandfather     Sleep Apnea Neg Hx     Ovarian Cancer Neg Hx     Tubal Cancer Neg Hx     Breast Cancer Neg Hx     Colorectal Cancer Neg Hx     Peritoneal Cancer Neg Hx      Social History     Socioeconomic History    Marital status:      Spouse name: Not on file    Number of children: Not on file    Years of education: Not on file    Highest education level: Doctorate   Occupational History    Not on file   Tobacco Use    Smoking status: Never    Smokeless tobacco: Never   Vaping Use    Vaping Use: Never used   Substance and Sexual Activity    Alcohol use: Not Currently     Alcohol/week: 0.6 - 1.2 oz     Types: 1 - 2 Standard drinks or equivalent per week    Drug use: No    Sexual activity: Yes     Partners: Female   Other Topics Concern    Not on file   Social History Narrative    Not on file     Social Determinants of Health     Financial Resource Strain: Low Risk  (1/9/2023)    Overall Financial Resource Strain (CARDIA)     Difficulty of Paying Living Expenses: Not hard at all   Food Insecurity: No Food Insecurity (1/9/2023)    Hunger Vital Sign     Worried About Running Out of Food in the Last Year: Never true     Ran Out of Food in the Last Year: Never true   Transportation Needs: No Transportation Needs (1/9/2023)    PRAPARE - Transportation     Lack of Transportation (Medical): No     Lack of Transportation (Non-Medical): No   Physical Activity: Sufficiently Active (1/9/2023)    Exercise Vital Sign     Days of Exercise per Week: 4 days     Minutes of Exercise per Session: 60 min   Stress: No Stress Concern Present (1/9/2023)    [place] [place] [employer] - Occupational Stress Questionnaire     Feeling of Stress : Not at all   Social Connections:  Socially Isolated (1/9/2023)    Social Connection and Isolation Panel [NHANES]     Frequency of Communication with Friends and Family: Once a week     Frequency of Social Gatherings with Friends and Family: Never     Attends Judaism Services: Never     Active Member of Clubs or Organizations: No     Attends Club or Organization Meetings: Never     Marital Status:    Intimate Partner Violence: Not on file   Housing Stability: Low Risk  (1/9/2023)    Housing Stability Vital Sign     Unable to Pay for Housing in the Last Year: No     Number of Places Lived in the Last Year: 2     Unstable Housing in the Last Year: No     Current Outpatient Medications   Medication Sig Dispense Refill    hydrocortisone 2.5 % Cream topical cream APPLY to affected hemorrhoids bid prn 28 g 3    lamotrigine (LAMICTAL) 150 MG tablet Take 1 Tablet by mouth every day.      OLANZapine (ZYPREXA) 2.5 MG Tab Take 1 Tablet by mouth every day.      nystatin (MYCOSTATIN) 067559 UNIT/GM Cream topical cream Apply to groin bid prn 60 g 1     No current facility-administered medications for this visit.      ALLERGIES: Patient has no known allergies.    ROS:  Constitutional: Denies fever, chills, sweats,  weight loss, fatigue  Cardiovascular: Denies chest pain, tightness, palpitations, swelling in legs/feet  Respiratory: Denies shortness of breath, cough, sputum, wheezing, painful breathing   Sleep: per HPI  Gastrointestinal: Denies  difficulty swallowing, nausea, abdominal pain, diarrhea, constipation, heartburn.  Musculoskeletal: Denies painful joints, sore muscles,       PHYSICAL EXAM:  There were no vitals taken for this visit.  Appearance: Well-nourished, well-developed, no acute distress  Eyes:  No scleral icterus , EOMI  ENMT: No redness of the oropharynx  Lung auscultation:  No wheezes rhonchi rubs or rales  Cardiac: No murmurs, rubs, or gallops; regular rhythm, normal rate; no edema  Musculoskeletal:  Grossly normal; gait and station  normal; digits and nails normal  Skin:  No rashes, petechiae, cyanosis  Neurologic: without focal signs; oriented to person, time, place, and purpose; judgement intact  Psychiatric:  No depression, anxiety, agitation  Mallampati score: Class III    Assessment and Plan:    The medical record was reviewed.    Diagnostic and titration nocturnal polysomnogram's, home sleep apnea tests, continuous nocturnal oximetry results, multiple sleep latency tests, and compliance reports reviewed.    Problem List Items Addressed This Visit    None    Have advised the patient to follow up with the appropriate healthcare practitioners for all other medical problems and issues.    No follow-ups on file.    Please note portions of this record was created using voice recognition software. I have made every reasonable attempt to correct obvious errors, but I expect that there are errors of grammar and possibly content I did not discover before finalizing the note.    Time spent in record review prior to patient arrival, reviewing results, and in face-to-face encounter totaled 19 min.  __________  MERCED Chao  Pulmonary & Sleep Medicine  [facility]

## 2023-10-03 ENCOUNTER — OFFICE VISIT (OUTPATIENT)
Dept: MEDICAL GROUP | Facility: MEDICAL CENTER | Age: 48
End: 2023-10-03
Payer: COMMERCIAL

## 2023-10-03 VITALS
HEIGHT: 72 IN | SYSTOLIC BLOOD PRESSURE: 102 MMHG | HEART RATE: 53 BPM | OXYGEN SATURATION: 95 % | TEMPERATURE: 97.3 F | BODY MASS INDEX: 22.87 KG/M2 | WEIGHT: 168.87 LBS | DIASTOLIC BLOOD PRESSURE: 60 MMHG

## 2023-10-03 DIAGNOSIS — J20.9 ACUTE BRONCHITIS, UNSPECIFIED ORGANISM: Primary | ICD-10-CM

## 2023-10-03 DIAGNOSIS — J06.9 UPPER RESPIRATORY TRACT INFECTION, UNSPECIFIED TYPE: ICD-10-CM

## 2023-10-03 LAB
FLUAV RNA SPEC QL NAA+PROBE: NEGATIVE
FLUBV RNA SPEC QL NAA+PROBE: NEGATIVE
RSV RNA SPEC QL NAA+PROBE: NEGATIVE
S PYO DNA SPEC NAA+PROBE: NOT DETECTED
SARS-COV-2 RNA RESP QL NAA+PROBE: NEGATIVE

## 2023-10-03 PROCEDURE — 87651 STREP A DNA AMP PROBE: CPT | Performed by: STUDENT IN AN ORGANIZED HEALTH CARE EDUCATION/TRAINING PROGRAM

## 2023-10-03 PROCEDURE — 99213 OFFICE O/P EST LOW 20 MIN: CPT | Performed by: STUDENT IN AN ORGANIZED HEALTH CARE EDUCATION/TRAINING PROGRAM

## 2023-10-03 PROCEDURE — 3074F SYST BP LT 130 MM HG: CPT | Performed by: STUDENT IN AN ORGANIZED HEALTH CARE EDUCATION/TRAINING PROGRAM

## 2023-10-03 PROCEDURE — 3078F DIAST BP <80 MM HG: CPT | Performed by: STUDENT IN AN ORGANIZED HEALTH CARE EDUCATION/TRAINING PROGRAM

## 2023-10-03 PROCEDURE — 0241U POCT CEPHEID COV-2, FLU A/B, RSV - PCR: CPT | Performed by: STUDENT IN AN ORGANIZED HEALTH CARE EDUCATION/TRAINING PROGRAM

## 2023-10-03 RX ORDER — AZITHROMYCIN 250 MG/1
TABLET, FILM COATED ORAL
Qty: 6 TABLET | Refills: 0 | Status: SHIPPED | OUTPATIENT
Start: 2023-10-03 | End: 2023-10-08

## 2023-10-03 RX ORDER — LAMOTRIGINE 100 MG/1
100 TABLET ORAL
COMMUNITY
Start: 2023-07-11 | End: 2023-10-03

## 2023-10-03 RX ORDER — FLUTICASONE PROPIONATE 50 MCG
1 SPRAY, SUSPENSION (ML) NASAL DAILY
Qty: 16 G | Refills: 1 | Status: SHIPPED | OUTPATIENT
Start: 2023-10-03 | End: 2023-11-09

## 2023-10-03 RX ORDER — LITHIUM CARBONATE 150 MG/1
CAPSULE ORAL
COMMUNITY
Start: 2023-08-24 | End: 2023-11-09

## 2023-10-03 RX ORDER — ALBUTEROL SULFATE 90 UG/1
2 AEROSOL, METERED RESPIRATORY (INHALATION) EVERY 4 HOURS PRN
Qty: 1 EACH | Refills: 1 | Status: SHIPPED | OUTPATIENT
Start: 2023-10-03 | End: 2023-11-09

## 2023-10-03 RX ORDER — LAMOTRIGINE 200 MG/1
TABLET ORAL
COMMUNITY
Start: 2023-08-23

## 2023-10-03 NOTE — PROGRESS NOTES
Chief Complaint   Patient presents with    Other     Runny nose/ sore throat/ and pain in ears         HPI: Patient is a 48 y.o. male complaining of 7 days of illness including: productive of clear sputum cough, shortness of breath, ear pain, fever, nasal congestion, rhinorrhea, sore throat, wheezing.   Mucus is: thin.  Similarly ill exposures: yes.  Treatments tried: Nyquil and dayquil with some relief  He  reports that he has never smoked. He has never used smokeless tobacco..     ROS:  No nausea, changes in bowel movements or skin rash.      I reviewed the patient's medications, allergies and medical history:  Current Outpatient Medications   Medication Sig Dispense Refill    lamotrigine (LAMICTAL) 200 MG tablet       lithium carbonate (ESKALITH) 150 MG Cap       azithromycin (ZITHROMAX) 250 MG Tab Take 2 pills first day, then 1 pill daily days 2-5, po. 6 Tablet 0    albuterol 108 (90 Base) MCG/ACT Aero Soln inhalation aerosol Inhale 2 Puffs every four hours as needed for Shortness of Breath. 1 Each 1    fluticasone (FLONASE) 50 MCG/ACT nasal spray Administer 1 Spray into affected nostril(S) every day. 16 g 1    hydrocortisone 2.5 % Cream topical cream APPLY to affected hemorrhoids bid prn 28 g 3    OLANZapine (ZYPREXA) 2.5 MG Tab Take 1 Tablet by mouth every day.      nystatin (MYCOSTATIN) 674226 UNIT/GM Cream topical cream Apply to groin bid prn 60 g 1     No current facility-administered medications for this visit.     Patient has no known allergies.  Past Medical History:   Diagnosis Date    Asthma     Childhood    Bipolar 1 disorder (HCC)     Chickenpox     Migraine         EXAM:  /60 (BP Location: Left arm, Patient Position: Sitting, BP Cuff Size: Adult)   Pulse (!) 53   Temp 36.3 °C (97.3 °F) (Temporal)   Ht 1.829 m (6')   Wt 76.6 kg (168 lb 14 oz)   SpO2 95%   General: Alert, no conversational dyspnea or audible wheeze, non-toxic appearance.  Eyes: PERRL, conjunctiva slightly injected, no eye  discharge.  Ears: Normal pinnae,TM's normal bilaterally.  Nares: Patent with thin mucus.  Sinuses: tender over maxillary / frontal sinuses.  Throat: Erythematous injection without exudate.   Neck: Supple, with no adenopathy.  Lungs: Clear to auscultation bilaterally, mild wheeze, crackles or rhonchi.   Heart: Bradycardia without murmur.  Skin: Warm and dry without rash.     ASSESSMENT:   1. Upper respiratory tract infection, unspecified type  2. Acute bronchitis, unspecified organism    - POCT Cepheid Group A Strep - PCR  - POCT Cepheid CoV-2, Flu A/B, RSV - PCR    strep, flu, RSV and COVID has been negative.  Given patient has symptoms for more than 7 days, has been improving 2 days back but started to get worse , I am concerned that this could be converting into a bacterial superinfection.  I will treat him with antibiotics.  Z-Mario ordered for 5 days.  I have also ordered albuterol inhaler given patient is reporting mild wheezing and shortness of breath with exertion.  Probably he has bronchitis with atypical organisms.  Azithromycin should cover this.  I am also sending Flonase for nasal congestion.  OTC anti-pyretics and decongestants as needed.  Follow-up in office or urgent care for worsening symptoms, difficulty breathing, lack of expected recovery, or should new symptoms or problems arise.    ER precautions discussed with patient    - azithromycin (ZITHROMAX) 250 MG Tab; Take 2 pills first day, then 1 pill daily days 2-5, po.  Dispense: 6 Tablet; Refill: 0  - albuterol 108 (90 Base) MCG/ACT Aero Soln inhalation aerosol; Inhale 2 Puffs every four hours as needed for Shortness of Breath.  Dispense: 1 Each; Refill: 1  - fluticasone (FLONASE) 50 MCG/ACT nasal spray; Administer 1 Spray into affected nostril(S) every day.  Dispense: 16 g; Refill: 1

## 2023-10-11 ENCOUNTER — TELEPHONE (OUTPATIENT)
Dept: SLEEP MEDICINE | Facility: MEDICAL CENTER | Age: 48
End: 2023-10-11
Payer: COMMERCIAL

## 2023-10-12 NOTE — TELEPHONE ENCOUNTER
10-11-23  NO SHOW  Date of No Show: 10-10-23  Provider: Dimitrios  Reason For Visit: FV  Outcome of call:    no answer LVM.  Left call back for scheduling [phone].     Reason Missed: N/A  ACM

## 2023-11-09 ENCOUNTER — OFFICE VISIT (OUTPATIENT)
Dept: MEDICAL GROUP | Facility: MEDICAL CENTER | Age: 48
End: 2023-11-09
Payer: COMMERCIAL

## 2023-11-09 VITALS
BODY MASS INDEX: 22.35 KG/M2 | HEART RATE: 54 BPM | OXYGEN SATURATION: 99 % | WEIGHT: 165 LBS | TEMPERATURE: 97.6 F | DIASTOLIC BLOOD PRESSURE: 78 MMHG | HEIGHT: 72 IN | SYSTOLIC BLOOD PRESSURE: 102 MMHG

## 2023-11-09 DIAGNOSIS — E55.9 VITAMIN D DEFICIENCY: ICD-10-CM

## 2023-11-09 DIAGNOSIS — K64.4 HEMORRHOIDAL SKIN TAG: ICD-10-CM

## 2023-11-09 DIAGNOSIS — G47.33 OSA ON CPAP: ICD-10-CM

## 2023-11-09 DIAGNOSIS — G57.01 PIRIFORMIS SYNDROME OF RIGHT SIDE: ICD-10-CM

## 2023-11-09 DIAGNOSIS — B35.1 ONYCHOMYCOSIS: Chronic | ICD-10-CM

## 2023-11-09 DIAGNOSIS — E78.5 DYSLIPIDEMIA: ICD-10-CM

## 2023-11-09 LAB
25(OH)D3+25(OH)D2 SERPL-MCNC: 27.7 NG/ML (ref 30–100)
BUN SERPL-MCNC: 19 MG/DL (ref 6–24)
BUN/CREAT SERPL: 20 (ref 9–20)
CALCIUM SERPL-MCNC: 9.8 MG/DL (ref 8.7–10.2)
CHLORIDE SERPL-SCNC: 105 MMOL/L (ref 96–106)
CHOLEST SERPL-MCNC: 224 MG/DL (ref 100–199)
CO2 SERPL-SCNC: 24 MMOL/L (ref 20–29)
CREAT SERPL-MCNC: 0.93 MG/DL (ref 0.76–1.27)
EGFRCR SERPLBLD CKD-EPI 2021: 101 ML/MIN/1.73
GLUCOSE SERPL-MCNC: 101 MG/DL (ref 70–99)
HBA1C MFR BLD: 5.8 % (ref 4.8–5.6)
HDLC SERPL-MCNC: 51 MG/DL
LABORATORY COMMENT REPORT: ABNORMAL
LDLC SERPL CALC-MCNC: 152 MG/DL (ref 0–99)
POTASSIUM SERPL-SCNC: 4.2 MMOL/L (ref 3.5–5.2)
SODIUM SERPL-SCNC: 142 MMOL/L (ref 134–144)
TRIGL SERPL-MCNC: 115 MG/DL (ref 0–149)
TSH SERPL DL<=0.005 MIU/L-ACNC: 2.33 UIU/ML (ref 0.45–4.5)
VIT B12 SERPL-MCNC: 478 PG/ML (ref 232–1245)
VLDLC SERPL CALC-MCNC: 21 MG/DL (ref 5–40)

## 2023-11-09 PROCEDURE — 3078F DIAST BP <80 MM HG: CPT | Performed by: STUDENT IN AN ORGANIZED HEALTH CARE EDUCATION/TRAINING PROGRAM

## 2023-11-09 PROCEDURE — 3074F SYST BP LT 130 MM HG: CPT | Performed by: STUDENT IN AN ORGANIZED HEALTH CARE EDUCATION/TRAINING PROGRAM

## 2023-11-09 PROCEDURE — 99214 OFFICE O/P EST MOD 30 MIN: CPT | Performed by: STUDENT IN AN ORGANIZED HEALTH CARE EDUCATION/TRAINING PROGRAM

## 2023-11-09 RX ORDER — TERBINAFINE HYDROCHLORIDE 250 MG/1
250 TABLET ORAL DAILY
Qty: 90 TABLET | Refills: 0 | Status: SHIPPED | OUTPATIENT
Start: 2023-11-09 | End: 2024-01-30 | Stop reason: SDUPTHER

## 2023-11-09 NOTE — PROGRESS NOTES
Comes in subjective:     CC: No chief complaint on file.      HPI:   Moy Enamorado is a 48 y.o. male who presents for an annual exam. He is feeling well and has no complaints.    Health Maintenance  Advanced directive: *** {age >=65}  PT for falls prevention: *** {65+ if higher risk for falls}  Cholesterol Screening: *** {40-74 yo, q5yr}  Diabetes Screening: *** {35-69 yo if overweight or obese}  AAA Screening: *** {65-74 yo who have ever smoked at least 100 cigs in lifetime. USPSTF: B}  Aspirin Use: ***  {40-59 for the primary prevention of cardiovascular disease (CVD) and colorectal cancer (CRC) if: 10% or greater 10-year CVD risk, not at increased risk for bleeding, life expectancy >10 years, and willing to take low-dose aspirin daily for 10 years. USPSTF: B}    Anticipatory Guidance  Diet: *** {BMI ?25 diet & physical activity counseling, BMI ?30 offer or referral for intervention. USPSTF: B}  Exercise: *** {BMI ?25 diet & physical activity counseling, BMI ?30 offer or referral for intervention. USPSTF: B}  Substance Abuse: *** {counseling if alcohol misuse. USPSTF: B}  Safe in relationship.   Seat belts, bike helmet, gun safety discussed.  Sun protection used.  Dental home.    Cancer screening  Colorectal Cancer Screening: ***  {45-75 yearly FIT testing, colonoscopy every 10 years, or flex sig every 5 years. USPSTF: A. 45-75 if /. PN Policy}  Lung Cancer Screening: ***  {50-80 yearly screen with 20 pack-years, currently smoke or quit <15 years. USPSTF: B}  Prostate Cancer Screening/PSA: *** {discuss, age 55-69. Maybe every 2 years. https://www.cancer.gov/types/prostate/psa-fact-sheet}    Infectious disease screening/Immunizations  --STI Screening: ***  --Practices safe sex.  --HIV Screening: *** {15-65 once. USPSTF: A; annually in MSM}  --Hepatitis C Screening: *** {18-79 once. USPSTF: B}  --Immunizations:    Influenza: *** {yearly}   HPV:  *** {F18-26, M18-21, M to 26 may be  vaccinated, 3 doses}   Tetanus: *** {every ten years with at least one tdap}   Shingles: n/a {once, >50, should call insurance or go through pharmacy}   Pneumococcal : ***      {age>=65:   1. No vaccine or unknown hx => recommend PCV20 (only dose)   2. PCV7 before age 65 => recommend PCV20 (only dose)   3. PCV13 only before age 65 =>     --1 dose of PCV20 at least 1 year after PCV13 OR    --1 dose PPSV23 ?1yr after PCV13   4. PPSV23 only before age 65 => recommend PCV20 only    --PVC20 ?1yr after PPSV23   5. PCV13 and PPSV23 before age 65, but no PPSV23 after age 65 => recommend PCV20 only    --PCV20 ?5yr after last pneumococcal dose   5. PCV13 and PPSV23 before age 65 and PPSV23 after age 65 => shared decision making  -Could consider 1 dose PCV20 ?5yr after last pneumoccocal dose}   Hepatitis B: ***  COVID-19: ***  Other immunizations: *** {Consider meningococcal, hepatitis A, hepatitis B, HIB.}    He  has a past medical history of Asthma, Bipolar 1 disorder (HCC), Chickenpox, and Migraine.    He has no past medical history of [place] measles.  He  has a past surgical history that includes nasal reconstruction (1990).  Family History   Problem Relation Age of Onset    Other Mother         Tremor    Parkinson's Disease Maternal Grandmother     Cancer Maternal Grandfather         cancer    Heart Disease Paternal Grandfather     Sleep Apnea Neg Hx     Ovarian Cancer Neg Hx     Tubal Cancer Neg Hx     Breast Cancer Neg Hx     Colorectal Cancer Neg Hx     Peritoneal Cancer Neg Hx      Social History     Tobacco Use    Smoking status: Never    Smokeless tobacco: Never   Vaping Use    Vaping Use: Never used   Substance Use Topics    Alcohol use: Not Currently     Alcohol/week: 0.6 - 1.2 oz     Types: 1 - 2 Standard drinks or equivalent per week    Drug use: No       Patient Active Problem List    Diagnosis Date Noted    GUILLE on CPAP 02/06/2023    Dyslipidemia 12/23/2021    Prediabetes 12/23/2021    Chronic pain of right  ankle 12/23/2021    Bipolar 1 disorder (HCC) 11/23/2021    Hemorrhoidal skin tag 11/09/2020    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 1 Each 1    fluticasone (FLONASE) 50 MCG/ACT nasal spray Administer 1 Spray into affected nostril(S) every day. 16 g 1    hydrocortisone 2.5 % Cream topical cream APPLY to affected hemorrhoids bid prn 28 g 3    OLANZapine (ZYPREXA) 2.5 MG Tab Take 1 Tablet by mouth every day.      nystatin (MYCOSTATIN) [id number] UNIT/GM Cream topical cream Apply to groin bid prn 60 g 1     No current facility-administered medications for this visit.    (including changes today)  Allergies: Patient has no known allergies.    Review of Systems   Constitutional: Negative for fever, chills and malaise/fatigue.   HENT: Negative for congestion.    Eyes: Negative for pain.   Respiratory: Negative for cough and shortness of breath.    Cardiovascular: Negative for leg swelling.   Gastrointestinal: Negative for nausea, vomiting, abdominal pain and diarrhea.   Genitourinary: Negative for dysuria and hematuria.   Skin: Negative for rash.   Neurological: Negative for dizziness, focal weakness and headaches.   Endo/Heme/Allergies: Does not bleed easily.   Psychiatric/Behavioral: Negative for depression.  The patient is not nervous/anxious.      Objective:     There were no vitals taken for this visit.  There is no height or weight on file to calculate BMI.  Wt Readings from Last 4 Encounters:   10/03/23 76.6 kg (168 lb 14 oz)   07/31/23 75.3 kg (166 lb)   05/30/23 80 kg (176 lb 6.4 oz)   02/06/23 78.4 kg (172 lb 12.8 oz)        Physical Exam:  Constitutional: Well-developed and well-nourished. Not diaphoretic. No distress.   Skin: Skin is warm and dry. No rash noted.  Head: Atraumatic without lesions.  Eyes: Conjunctivae and extraocular motions are normal. Pupils are equal, round, and reactive to light. No scleral icterus.   Ears:  External ears unremarkable. Tympanic membranes clear and intact.  Nose: Nares patent. Septum midline. Turbinates without erythema nor edema. No discharge.   Mouth/Throat: Dentition is ***. Tongue normal. Oropharynx is clear and moist. Posterior pharynx without erythema or exudates.  Neck: Supple, trachea midline. Normal range of motion. No thyromegaly present. No lymphadenopathy--cervical or supraclavicular.  Cardiovascular: Regular rate and rhythm, S1 and S2 without murmur, rubs, or gallops.    Lungs: Effort normal. Clear to auscultation throughout. No adventitious sounds. No CVA tenderness.  Abdomen: Soft, non tender, and without distention. Active bowel sounds in all four quadrants. No rebound, guarding, masses or HSM.  : Genitalia: {GENITALIA NEGATIVES LIST MALE:710}  Rectal: {RECTAL (ZEUS):94752}  Prostate: {PROSTATE:99540}  Extremities: No cyanosis, clubbing, erythema, nor edema. Distal pulses intact and symmetric.   Musculoskeletal: All major joints AROM full in all directions without pain.  Neurological: Alert and oriented x 3. DTRs 2+/3 and symmetric. No cranial nerve deficit. 5/5 myotomes. Sensation intact.  Psychiatric:  Behavior, mood, and affect are appropriate.    A chaperone was offered to the patient during today's exam. {CHAPERONE:95155}    Assessment and Plan:     No diagnosis found.    HCM: ***.  Labs per orders.  Vaccinations per orders.  Counseling about diet, supplements, exercise, skin care and safe sex.    Referral for genetic research was offered. Patient {declined/accepted}.    Follow-up: No follow-ups on file.

## 2023-11-09 NOTE — PROGRESS NOTES
Subjective:     CC:     HPI:   Moy presents today with    Initially scheduled as annual wellness visit for this was converted to regular visit      #Fatigue  In past 2 years patient has noted nonspecific fatigue, he reports after strenuous exercise he would have to rest for a long period of time.  - He reports his psychiatric disorder is well controlled on current medication  - He reports he is adherent with CPAP  -He denies chest pain shortness of breath dyspnea exertion  - vitamin deficiency noted on lab      48-year-old male presents to follow-up on labs  Overall labs shows elevated cholesterol and prediabetes, vitamin D deficiency    # Pain in the glut  -Straight leg test negative bilaterally  -No pain with hip internal rotation external rotation  - There is a point tenderness around the gluteus mekhi  -Recommend conservative management    Dyslipidemia    Prediabetes  - reports health diet  - previously vegetarian, now vegetarian based  - fruits and vegetable      No problems updated.    Health Maintenance:     ROS:  ROS    Objective:     Exam:  /78 (BP Location: Left arm, Patient Position: Sitting)   Pulse (!) 54   Temp 36.4 °C (97.6 °F) (Temporal)   Ht 1.829 m (6')   Wt 74.8 kg (165 lb)   SpO2 99%   BMI 22.38 kg/m²  Body mass index is 22.38 kg/m².    Physical Exam      Labs:     Assessment & Plan:     48 y.o. male with the following -     1. Dyslipidemia  Chronic, stable  Non-HDL / LDL > 30   Patient would like to undergo coronary calcium scoring for further assess coronary risk  Patient would like to undergo lipoprotein a  Reported family history of cholesterol and prediabetes issues  - Lipoprotein (a); Future  - CT-CARDIAC SCORING; Future  - Comp Metabolic Panel; Future    2. GUILLE on CPAP  Chronic, stable  - DME Mask and Supplies    3. Onychomycosis  Chronic, stable  Recheck lab in 4 weeks  No interaction with psychiatric medication  - terbinafine (LAMISIL) 250 MG Tab; Take 1 Tablet by mouth  every day.  Dispense: 90 Tablet; Refill: 0    4. Hemorrhoidal skin tag  Chronic, stable  - hydrocortisone 2.5 % Cream topical cream; APPLY to affected hemorrhoids bid prn  Dispense: 28 g; Refill: 3    5. Vitamin D deficiency  Chronic, stable  Recommend OTC vitamin D 2000 IU daily    6. Piriformis syndrome of right side  Acute, stable  Recommend conservative management        Return in about 3 months (around 2/9/2024) for Lab review, Med check.    Please note that this dictation was created using voice recognition software. I have made every reasonable attempt to correct obvious errors, but I expect that there are errors of grammar and possibly content that I did not discover before finalizing the note.

## 2023-11-16 ENCOUNTER — HOSPITAL ENCOUNTER (OUTPATIENT)
Dept: RADIOLOGY | Facility: MEDICAL CENTER | Age: 48
End: 2023-11-16
Attending: STUDENT IN AN ORGANIZED HEALTH CARE EDUCATION/TRAINING PROGRAM
Payer: COMMERCIAL

## 2023-11-16 DIAGNOSIS — E78.5 DYSLIPIDEMIA: ICD-10-CM

## 2023-11-16 PROCEDURE — 4410556 CT-CARDIAC SCORING (SELF PAY ONLY)

## 2023-12-09 LAB
ALBUMIN SERPL-MCNC: 4.5 G/DL (ref 4.1–5.1)
ALBUMIN/GLOB SERPL: 2.3 {RATIO} (ref 1.2–2.2)
ALP SERPL-CCNC: 106 IU/L (ref 44–121)
ALT SERPL-CCNC: 24 IU/L (ref 0–44)
AST SERPL-CCNC: 16 IU/L (ref 0–40)
BILIRUB SERPL-MCNC: 0.7 MG/DL (ref 0–1.2)
BUN SERPL-MCNC: 16 MG/DL (ref 6–24)
BUN/CREAT SERPL: 18 (ref 9–20)
CALCIUM SERPL-MCNC: 9.2 MG/DL (ref 8.7–10.2)
CHLORIDE SERPL-SCNC: 104 MMOL/L (ref 96–106)
CO2 SERPL-SCNC: 23 MMOL/L (ref 20–29)
CREAT SERPL-MCNC: 0.9 MG/DL (ref 0.76–1.27)
EGFRCR SERPLBLD CKD-EPI 2021: 105 ML/MIN/1.73
GLOBULIN SER CALC-MCNC: 2 G/DL (ref 1.5–4.5)
GLUCOSE SERPL-MCNC: 84 MG/DL (ref 70–99)
LPA SERPL-SCNC: 347.7 NMOL/L
POTASSIUM SERPL-SCNC: 4.5 MMOL/L (ref 3.5–5.2)
PROT SERPL-MCNC: 6.5 G/DL (ref 6–8.5)
SODIUM SERPL-SCNC: 141 MMOL/L (ref 134–144)

## 2024-01-30 ENCOUNTER — OFFICE VISIT (OUTPATIENT)
Dept: MEDICAL GROUP | Facility: MEDICAL CENTER | Age: 49
End: 2024-01-30
Payer: COMMERCIAL

## 2024-01-30 VITALS
SYSTOLIC BLOOD PRESSURE: 94 MMHG | WEIGHT: 174 LBS | HEIGHT: 72 IN | HEART RATE: 58 BPM | BODY MASS INDEX: 23.57 KG/M2 | DIASTOLIC BLOOD PRESSURE: 58 MMHG | OXYGEN SATURATION: 97 % | TEMPERATURE: 98.1 F

## 2024-01-30 DIAGNOSIS — R73.03 PREDIABETES: ICD-10-CM

## 2024-01-30 DIAGNOSIS — B35.1 ONYCHOMYCOSIS: Chronic | ICD-10-CM

## 2024-01-30 DIAGNOSIS — E78.5 DYSLIPIDEMIA: Chronic | ICD-10-CM

## 2024-01-30 DIAGNOSIS — E78.41 ELEVATED LIPOPROTEIN(A): ICD-10-CM

## 2024-01-30 PROCEDURE — 99214 OFFICE O/P EST MOD 30 MIN: CPT | Performed by: STUDENT IN AN ORGANIZED HEALTH CARE EDUCATION/TRAINING PROGRAM

## 2024-01-30 PROCEDURE — 3078F DIAST BP <80 MM HG: CPT | Performed by: STUDENT IN AN ORGANIZED HEALTH CARE EDUCATION/TRAINING PROGRAM

## 2024-01-30 PROCEDURE — 3074F SYST BP LT 130 MM HG: CPT | Performed by: STUDENT IN AN ORGANIZED HEALTH CARE EDUCATION/TRAINING PROGRAM

## 2024-01-30 RX ORDER — ZOLPIDEM TARTRATE 5 MG/1
5 TABLET ORAL
COMMUNITY
Start: 2024-01-25

## 2024-01-30 RX ORDER — LAMOTRIGINE 100 MG/1
TABLET ORAL
COMMUNITY
Start: 2024-01-09

## 2024-01-30 RX ORDER — HYDROXYZINE PAMOATE 25 MG/1
CAPSULE ORAL
COMMUNITY
Start: 2024-01-09 | End: 2024-03-13

## 2024-01-30 RX ORDER — TERBINAFINE HYDROCHLORIDE 250 MG/1
250 TABLET ORAL DAILY
Qty: 30 TABLET | Refills: 1 | Status: SHIPPED | OUTPATIENT
Start: 2024-01-30

## 2024-01-30 RX ORDER — BUPROPION HYDROCHLORIDE 150 MG/1
150 TABLET ORAL EVERY MORNING
COMMUNITY
Start: 2024-01-09

## 2024-01-30 ASSESSMENT — ENCOUNTER SYMPTOMS
WEIGHT LOSS: 0
SHORTNESS OF BREATH: 0
PALPITATIONS: 0
CHILLS: 0
VOMITING: 0
HEADACHES: 0
FEVER: 0
DIZZINESS: 0
WHEEZING: 0
NAUSEA: 0

## 2024-01-30 NOTE — PROGRESS NOTES
Subjective:     CC:     HPI:   Moy presents today with    PMH hypercholesterolemia/ lipoprotein a 300s/ ct coronary score 0(2023), ilan on cpap ,onychomycosis on terbinafin, hemorrhoid, vit D deficiency,     Lab reviewed - Prediabetes 5.8, vit d deficiency    Health Maintenance:     ROS:  Review of Systems   Constitutional:  Negative for chills, fever and weight loss.   HENT:  Negative for hearing loss.    Respiratory:  Negative for shortness of breath and wheezing.    Cardiovascular:  Negative for chest pain and palpitations.   Gastrointestinal:  Negative for nausea and vomiting.   Genitourinary:  Negative for frequency and urgency.   Skin:  Negative for rash.   Neurological:  Negative for dizziness and headaches.       Objective:     Exam:  BP 94/58 (BP Location: Left arm, Patient Position: Sitting)   Pulse (!) 58   Temp 36.7 °C (98.1 °F) (Temporal)   Ht 1.829 m (6')   Wt 78.9 kg (174 lb)   SpO2 97%   BMI 23.60 kg/m²  Body mass index is 23.6 kg/m².    Physical Exam  Constitutional:       Appearance: Normal appearance.   Cardiovascular:      Rate and Rhythm: Normal rate and regular rhythm.   Pulmonary:      Effort: Pulmonary effort is normal.      Breath sounds: Normal breath sounds.   Musculoskeletal:      Cervical back: Normal range of motion and neck supple.   Lymphadenopathy:      Cervical: No cervical adenopathy.   Neurological:      Mental Status: He is alert.         Labs:     Assessment & Plan:     48 y.o. male with the following -     1. Dyslipidemia  2. Elevated lipoprotein(a)  Chronic, ,stable  Denies family history of early heart disease  Low ascvd  Ct coronary calcium of 0   Lipoprotein a 300s  Plan  Discuss statin therapy and referral for vascular for guidance  - Referral to Vascular Medicine    3. Onychomycosis  Chronic, stable  Liver enzyme stable    - terbinafine (LAMISIL) 250 MG Tab; Take 1 Tablet by mouth every day.  Dispense: 30 Tablet; Refill: 1    4. Prediabetes  Chronic stable  Diet/  exercise        Return in about 6 months (around 7/30/2024) for Med check.    Please note that this dictation was created using voice recognition software. I have made every reasonable attempt to correct obvious errors, but I expect that there are errors of grammar and possibly content that I did not discover before finalizing the note.

## 2024-01-30 NOTE — LETTER
[facility]  Jonathan Elmore M.D.  [address]  [place] [address]  Fax: [phone]   Authorization for Release/Disclosure of   Protected Health Information   Name: ARLINE DON : 1975 SSN: [id number]   Address: [facility] [place] [address] Phone:    [phone] (home)    I authorize the entity listed below to release/disclose the PHI below to:   [facility]/Jonathan Elmore M.D. and Jonathan Elmore M.D.   Provider or Entity Name:  [facility]    Address   City, State, Zip   Phone:      Fax:     Reason for request: continuity of care   Information to be released:    [  x] LAST COLONOSCOPY,  including any PATH REPORT and follow-up  [  ] LAST FIT/COLOGUARD RESULT [  ] LAST DEXA  [  ] LAST MAMMOGRAM  [  ] LAST PAP  [  ] LAST LABS [  ] RETINA EXAM REPORT  [  ] IMMUNIZATION RECORDS  [  ] Release all info      [  ] Check here and initial the line next to each item to release ALL health information INCLUDING  _____ Care and treatment for drug and / or alcohol abuse  _____ HIV testing, infection status, or AIDS  _____ Genetic Testing    DATES OF SERVICE OR TIME PERIOD TO BE DISCLOSED: _____________  I understand and acknowledge that:  * This Authorization may be revoked at any time by you in writing, except if your health information has already been used or disclosed.  * Your health information that will be used or disclosed as a result of you signing this authorization could be re-disclosed by the recipient. If this occurs, your re-disclosed health information may no longer be protected by State or Federal laws.  * You may refuse to sign this Authorization. Your refusal will not affect your ability to obtain treatment.  * This Authorization becomes effective upon signing and will  on (date) __________.      If no date is indicated, this Authorization will  one (1) year from the signature date.    Name: Arline Don  Signature: Date:   2024      PLEASE FAX REQUESTED RECORDS BACK TO: [phone]

## 2024-02-12 ENCOUNTER — TELEPHONE (OUTPATIENT)
Dept: HEALTH INFORMATION MANAGEMENT | Facility: OTHER | Age: 49
End: 2024-02-12
Payer: COMMERCIAL

## 2024-03-13 ENCOUNTER — OFFICE VISIT (OUTPATIENT)
Dept: URGENT CARE | Facility: CLINIC | Age: 49
End: 2024-03-13
Payer: COMMERCIAL

## 2024-03-13 ENCOUNTER — APPOINTMENT (OUTPATIENT)
Dept: URGENT CARE | Facility: CLINIC | Age: 49
End: 2024-03-13
Payer: COMMERCIAL

## 2024-03-13 ENCOUNTER — APPOINTMENT (OUTPATIENT)
Dept: RADIOLOGY | Facility: IMAGING CENTER | Age: 49
End: 2024-03-13
Attending: NURSE PRACTITIONER
Payer: COMMERCIAL

## 2024-03-13 VITALS
OXYGEN SATURATION: 97 % | BODY MASS INDEX: 23.03 KG/M2 | WEIGHT: 170 LBS | HEIGHT: 72 IN | HEART RATE: 56 BPM | TEMPERATURE: 97.3 F | SYSTOLIC BLOOD PRESSURE: 114 MMHG | DIASTOLIC BLOOD PRESSURE: 58 MMHG | RESPIRATION RATE: 16 BRPM

## 2024-03-13 DIAGNOSIS — J98.8 RTI (RESPIRATORY TRACT INFECTION): ICD-10-CM

## 2024-03-13 DIAGNOSIS — R05.1 ACUTE COUGH: ICD-10-CM

## 2024-03-13 LAB
FLUAV RNA SPEC QL NAA+PROBE: NEGATIVE
FLUBV RNA SPEC QL NAA+PROBE: NEGATIVE
RSV RNA SPEC QL NAA+PROBE: NEGATIVE
SARS-COV-2 RNA RESP QL NAA+PROBE: NEGATIVE

## 2024-03-13 PROCEDURE — 99214 OFFICE O/P EST MOD 30 MIN: CPT | Performed by: NURSE PRACTITIONER

## 2024-03-13 PROCEDURE — 3074F SYST BP LT 130 MM HG: CPT | Performed by: NURSE PRACTITIONER

## 2024-03-13 PROCEDURE — 3078F DIAST BP <80 MM HG: CPT | Performed by: NURSE PRACTITIONER

## 2024-03-13 PROCEDURE — 0241U POCT CEPHEID COV-2, FLU A/B, RSV - PCR: CPT | Performed by: NURSE PRACTITIONER

## 2024-03-13 PROCEDURE — 71045 X-RAY EXAM CHEST 1 VIEW: CPT | Mod: TC,FY | Performed by: NURSE PRACTITIONER

## 2024-03-13 RX ORDER — AZITHROMYCIN 250 MG/1
TABLET, FILM COATED ORAL
Qty: 6 TABLET | Refills: 0 | Status: SHIPPED | OUTPATIENT
Start: 2024-03-13 | End: 2024-03-18

## 2024-03-13 RX ORDER — AZITHROMYCIN 250 MG/1
TABLET, FILM COATED ORAL
Qty: 6 TABLET | Refills: 0 | Status: SHIPPED | OUTPATIENT
Start: 2024-03-13 | End: 2024-03-13 | Stop reason: SDUPTHER

## 2024-03-13 ASSESSMENT — ENCOUNTER SYMPTOMS
COUGH: 1
FEVER: 0
CARDIOVASCULAR NEGATIVE: 1
CHILLS: 0
SHORTNESS OF BREATH: 0
MYALGIAS: 0
SORE THROAT: 0

## 2024-03-13 ASSESSMENT — VISUAL ACUITY: OU: 1

## 2024-03-13 NOTE — PATIENT INSTRUCTIONS
Details    Reading Physician Reading Date Result Priority   Denys Llanes M.D.  [phone] 3/13/2024      Narrative & Impression     3/13/2024 10:23 AM     HISTORY/REASON FOR EXAM:  Cough        TECHNIQUE/EXAM DESCRIPTION AND NUMBER OF VIEWS:  Single portable view of the chest.     COMPARISON: None     FINDINGS:        Cardiomediastinal silhouette is normal.     No focal consolidation, pleural effusion, pulmonary edema or pneumothorax.     No acute osseous abnormality.     IMPRESSION:     No acute cardiopulmonary abnormality.           Exam Ended: 03/13/24 10:33 AM Last Resulted: 03/13/24 11:00 AM

## 2024-03-13 NOTE — PROGRESS NOTES
"Subjective:     Moy Enamorado is a 48 y.o. male who presents for Congestion (X 1 day, \"Cough, fatigue.\")       URI   This is a new problem. The current episode started yesterday. The problem has been gradually worsening. Associated symptoms include congestion and coughing. Pertinent negatives include no chest pain or sore throat.     Hx of asthma.    Meeting up with 10 people on Friday. Inquiring about abx therapy.    Review of Systems   Constitutional:  Positive for malaise/fatigue. Negative for chills and fever.   HENT:  Positive for congestion. Negative for sore throat.    Respiratory:  Positive for cough. Negative for shortness of breath.    Cardiovascular: Negative.  Negative for chest pain.   Musculoskeletal:  Negative for myalgias.   All other systems reviewed and are negative.    Refer to HPI for additional details.    During this visit, appropriate PPE was worn, and hand hygiene was performed.    PMH:  has a past medical history of Asthma, Bipolar 1 disorder (HCC), Chickenpox, and Migraine.    He has no past medical history of [place] measles.    MEDS:   Current Outpatient Medications:     azithromycin (ZITHROMAX) 250 MG Tab, Take 2 tabs by mouth once today, then one tab by mouth once daily days 2-5., Disp: 6 Tablet, Rfl: 0    zolpidem (AMBIEN) 5 MG Tab, Take 5 mg by mouth at bedtime as needed., Disp: , Rfl:     lamoTRIgine (LAMICTAL) 100 MG Tab, PLEASE SEE ATTACHED FOR DETAILED DIRECTIONS, Disp: , Rfl:     buPROPion (WELLBUTRIN XL) 150 MG XL tablet, Take 150 mg by mouth every morning., Disp: , Rfl:     terbinafine (LAMISIL) 250 MG Tab, Take 1 Tablet by mouth every day., Disp: 30 Tablet, Rfl: 1    lamotrigine (LAMICTAL) 200 MG tablet, , Disp: , Rfl:     OLANZapine (ZYPREXA) 2.5 MG Tab, Take 1 Tablet by mouth every day., Disp: , Rfl:     hydrocortisone 2.5 % Cream topical cream, APPLY to affected hemorrhoids bid prn, Disp: 28 g, Rfl: 3    ALLERGIES: No Known Allergies  SURGHX:   Past Surgical History: "   Procedure Laterality Date    NASAL RECONSTRUCTION  1990     SOCHX:  reports that he has never smoked. He has never used smokeless tobacco. He reports that he does not currently use alcohol after a past usage of about 0.6 - 1.2 oz of alcohol per week. He reports that he does not use drugs.    FH: Per HPI as applicable/pertinent.      Objective:     /58 (BP Location: Left arm, Patient Position: Sitting, BP Cuff Size: Adult)   Pulse (!) 56   Temp 36.3 °C (97.3 °F) (Temporal)   Resp 16   Ht 1.829 m (6')   Wt 77.1 kg (170 lb) Comment: Pt stated  SpO2 97%   BMI 23.06 kg/m²     Physical Exam  Nursing note reviewed.   Constitutional:       General: He is not in acute distress.     Appearance: He is well-developed. He is not ill-appearing or toxic-appearing.   HENT:      Nose: Nose normal.      Mouth/Throat:      Mouth: Mucous membranes are moist.      Pharynx: Oropharynx is clear.   Eyes:      General: Vision grossly intact.      Extraocular Movements: Extraocular movements intact.   Neck:      Trachea: Phonation normal.   Cardiovascular:      Rate and Rhythm: Normal rate and regular rhythm.      Heart sounds: Normal heart sounds.   Pulmonary:      Effort: Pulmonary effort is normal. No respiratory distress.      Breath sounds: Normal breath sounds. No decreased breath sounds.   Musculoskeletal:         General: No deformity. Normal range of motion.      Cervical back: Normal range of motion.   Skin:     General: Skin is warm and dry.      Coloration: Skin is not pale.   Neurological:      Mental Status: He is alert and oriented to person, place, and time.      Motor: No weakness.   Psychiatric:         Behavior: Behavior normal. Behavior is cooperative.     Chest x-ray:    Details    Reading Physician Reading Date Result Priority   Denys Llanes M.D.  [phone] 3/13/2024      Narrative & Impression     3/13/2024 10:23 AM     HISTORY/REASON FOR EXAM:  Cough     TECHNIQUE/EXAM DESCRIPTION AND NUMBER OF  VIEWS:  Single portable view of the chest.     COMPARISON: None     FINDINGS:    Cardiomediastinal silhouette is normal.     No focal consolidation, pleural effusion, pulmonary edema or pneumothorax.     No acute osseous abnormality.     IMPRESSION:     No acute cardiopulmonary abnormality.           Exam Ended: 03/13/24 10:33 AM Last Resulted: 03/13/24 11:00 AM           Radiology report and images reviewed by myself. Concur with findings.    POCT Cepheid CoV-2, Flu A/B, RSV by PCR: negative      Assessment/Plan:     1. Acute cough  - POCT CEPHEID COV-2, FLU A/B, RSV - PCR  - DX-CHEST-LIMITED (1 VIEW); Future    2. RTI (respiratory tract infection)    Discussed likely self-limiting viral etiology and expected course and duration of illness. Vital signs stable, afebrile, no acute distress at this time. Chest x-ray clear.    Emphasize supportive measures, rest, fluids, and symptom management with over-the-counter medication as needed.    Standard precautions/mask/wash hands.    Contingent antibiotic.    Monitor. Return precautions advised.     Differential diagnosis, natural history, supportive care, over-the-counter symptom management per 's instructions, close monitoring, and indications for immediate follow-up discussed.     All questions answered. Patient agrees with the plan of care.    Discharge summary provided.

## 2024-04-18 NOTE — LETTER
November 9, 2020    To Whom It May Concern:         This is confirmation that Moy Kelsea attended his scheduled appointment with Ranjit Contreras M.D. on 11/09/20. I recommend that he use a desk that can go between standing and sitting.    Sincerely,          Ranjit Contreras M.D.  [phone]                
Attending Only

## 2024-08-06 ENCOUNTER — HOSPITAL ENCOUNTER (OUTPATIENT)
Dept: LAB | Facility: MEDICAL CENTER | Age: 49
End: 2024-08-06
Attending: STUDENT IN AN ORGANIZED HEALTH CARE EDUCATION/TRAINING PROGRAM
Payer: COMMERCIAL

## 2024-08-06 DIAGNOSIS — E78.5 DYSLIPIDEMIA: ICD-10-CM

## 2024-08-06 LAB
ALBUMIN SERPL BCP-MCNC: 4.5 G/DL (ref 3.2–4.9)
ALBUMIN/GLOB SERPL: 1.9 G/DL
ALP SERPL-CCNC: 91 U/L (ref 30–99)
ALT SERPL-CCNC: 34 U/L (ref 2–50)
ANION GAP SERPL CALC-SCNC: 13 MMOL/L (ref 7–16)
AST SERPL-CCNC: 22 U/L (ref 12–45)
BILIRUB SERPL-MCNC: 0.6 MG/DL (ref 0.1–1.5)
BUN SERPL-MCNC: 14 MG/DL (ref 8–22)
CALCIUM ALBUM COR SERPL-MCNC: 9.3 MG/DL (ref 8.5–10.5)
CALCIUM SERPL-MCNC: 9.7 MG/DL (ref 8.5–10.5)
CHLORIDE SERPL-SCNC: 104 MMOL/L (ref 96–112)
CO2 SERPL-SCNC: 25 MMOL/L (ref 20–33)
CREAT SERPL-MCNC: 0.91 MG/DL (ref 0.5–1.4)
GFR SERPLBLD CREATININE-BSD FMLA CKD-EPI: 102 ML/MIN/1.73 M 2
GLOBULIN SER CALC-MCNC: 2.4 G/DL (ref 1.9–3.5)
GLUCOSE SERPL-MCNC: 102 MG/DL (ref 65–99)
POTASSIUM SERPL-SCNC: 4.2 MMOL/L (ref 3.6–5.5)
PROT SERPL-MCNC: 6.9 G/DL (ref 6–8.2)
SODIUM SERPL-SCNC: 142 MMOL/L (ref 135–145)

## 2024-08-06 PROCEDURE — 80053 COMPREHEN METABOLIC PANEL: CPT

## 2024-08-06 PROCEDURE — 83695 ASSAY OF LIPOPROTEIN(A): CPT

## 2024-08-06 PROCEDURE — 36415 COLL VENOUS BLD VENIPUNCTURE: CPT

## 2024-08-08 LAB — LPA SERPL-MCNC: 297 MG/DL

## 2024-08-12 ENCOUNTER — TELEMEDICINE (OUTPATIENT)
Dept: SLEEP MEDICINE | Facility: MEDICAL CENTER | Age: 49
End: 2024-08-12
Attending: PHYSICIAN ASSISTANT
Payer: COMMERCIAL

## 2024-08-12 VITALS — BODY MASS INDEX: 23.7 KG/M2 | WEIGHT: 175 LBS | HEIGHT: 72 IN

## 2024-08-12 DIAGNOSIS — G47.33 OSA ON CPAP: ICD-10-CM

## 2024-08-12 DIAGNOSIS — G47.00 INSOMNIA, UNSPECIFIED TYPE: ICD-10-CM

## 2024-08-12 PROCEDURE — 99213 OFFICE O/P EST LOW 20 MIN: CPT | Mod: 95 | Performed by: PHYSICIAN ASSISTANT

## 2024-08-12 RX ORDER — ZALEPLON 5 MG/1
CAPSULE ORAL
COMMUNITY
Start: 2024-07-30

## 2024-08-12 ASSESSMENT — ENCOUNTER SYMPTOMS
WEIGHT LOSS: 0
COUGH: 0
HEARTBURN: 0
SPUTUM PRODUCTION: 0
DIZZINESS: 0
ORTHOPNEA: 0
WHEEZING: 0
FEVER: 0
SHORTNESS OF BREATH: 0
PALPITATIONS: 0
CHILLS: 0
SORE THROAT: 0
INSOMNIA: 1
TREMORS: 1
HEADACHES: 0
SINUS PAIN: 0

## 2024-08-12 NOTE — PATIENT INSTRUCTIONS
"1-reviewed sleep hygiene   Minimize caffeine use    Minimize blue light exposure for 2-3 hours before bed    Uses \"sleep score\" glenn    Direct correlation with exercise   Does not follow with behavioral health     2-Referral to sleep behavioral health Dr. Candis Boyd  3-is seen for medication management Dr. Marisa King   4-will reach out via my chart after reviewing compliance data sent via email   5-As a reminder use distilled water only in humidifier chamber/consider using when receives heated tubing     6-Today we reviewed equipment cleaning  once weekly minimum  mask, tubing and water chamber  use dedicated container  use mild soap and water  SoClean or other ozone  are not recommended  white vinegar and water solution is no longer recommended  hang tubing to dry  mask sanitizing wipes are an option for use   7-Equipment replacement schedule : Mask every 6 months, Head gear every 6 months, Tubing every 3 months, Ultra-fine filters 2 times per month, Humidifier chamber every 6 months  8-follow up in 3 months, minimum follow up is yearly   "

## 2024-08-12 NOTE — PROGRESS NOTES
Virtual Visit: Established Patient   This visit was conducted via Teams using secure and encrypted videoconferencing technology.   The patient was in their home in the [facility].    The patient's identity was confirmed and verbal consent was obtained for this virtual visit.     Subjective:     Chief Complaint   Patient presents with    Apnea     Last Office Visit 7/31/23 with EDYTA Lopez.       HPI:  Moy Enamorado is a 49 y.o. year old male here today for follow-up on obstructive sleep apnea.  Patient last seen in clinic 7/31/2023 by MERCED Chao.    Past Medical History: GUILLE, bipolar 1 disorder, dyslipidemia, migraines, asthma.  Vitals:  Ht 1.829 m (6')   Wt 79.4 kg (175 lb)   BMI 23.73 kg/m²     Recent Imaging: Chest x-ray obtained 3/13/2024 demonstrated no acute cardiopulmonary abnormality.    Currently using  Resmed auto CPAP @5-15 cm H20 pressure; no wireless access available.  He will return to clinic with device for manual download.    Device obtained October 2022  DME provider [facility] [place]  Mask interface fullface mask    Overnight home sleep study obtained 4/26/2022 which demonstrated mild obstructive sleep apnea with AGGIE of 12 events per hour, low O2 sat of 75%.  Sats less than or equal to 88 for 1% of the evaluated time.  Auto CPAP trial recommended.      Sleep schedule goes to bed 10-11 p.m., wakens by 530 a.m. , and gets up during the night x 1 around 230 and may not be able to go back to sleep   Symptoms denies day time somnolence and denies morning headache    [place] Sleepiness Scale reported as 7/24 on 2/2/2022   .      Review of Systems   Constitutional:  Positive for malaise/fatigue. Negative for chills, fever and weight loss.   HENT:  Negative for congestion, hearing loss, nosebleeds, sinus pain, sore throat and tinnitus.    Eyes:         Presc glasses    Respiratory:  Negative for cough, sputum production, shortness of breath and wheezing.    Cardiovascular:   Negative for chest pain, palpitations, orthopnea and leg swelling.   Gastrointestinal:  Negative for heartburn.        No dentures, missing no teeth, no swallowing issues    Neurological:  Positive for tremors. Negative for dizziness and headaches.   Psychiatric/Behavioral:  The patient has insomnia (both, greater maintenance).        Past Medical History:   Diagnosis Date    Asthma     Childhood    Bipolar 1 disorder (HCC)     Chickenpox     Migraine        Past Surgical History:   Procedure Laterality Date    NASAL RECONSTRUCTION  1990       Family History   Problem Relation Age of Onset    Other Mother         Tremor    Hyperlipidemia Sister     Parkinson's Disease Maternal Grandmother     Cancer Maternal Grandfather         cancer    Heart Disease Paternal Grandfather     Sleep Apnea Neg Hx     Ovarian Cancer Neg Hx     Tubal Cancer Neg Hx     Breast Cancer Neg Hx     Colorectal Cancer Neg Hx     Peritoneal Cancer Neg Hx        Social History     Socioeconomic History    Marital status:      Spouse name: Not on file    Number of children: Not on file    Years of education: Not on file    Highest education level: Doctorate   Occupational History    Not on file   Tobacco Use    Smoking status: Never    Smokeless tobacco: Never   Vaping Use    Vaping status: Never Used   Substance and Sexual Activity    Alcohol use: Not Currently     Alcohol/week: 0.6 - 1.2 oz     Types: 1 - 2 Standard drinks or equivalent per week    Drug use: No    Sexual activity: Yes     Partners: Female   Other Topics Concern    Not on file   Social History Narrative    Not on file     Social Determinants of Health     Financial Resource Strain: Low Risk  (1/9/2023)    Overall Financial Resource Strain (CARDIA)     Difficulty of Paying Living Expenses: Not hard at all   Food Insecurity: No Food Insecurity (1/9/2023)    Hunger Vital Sign     Worried About Running Out of Food in the Last Year: Never true     Ran Out of Food in the Last  Year: Never true   Transportation Needs: No Transportation Needs (1/9/2023)    PRAPARE - Transportation     Lack of Transportation (Medical): No     Lack of Transportation (Non-Medical): No   Physical Activity: Sufficiently Active (1/9/2023)    Exercise Vital Sign     Days of Exercise per Week: 4 days     Minutes of Exercise per Session: 60 min   Stress: No Stress Concern Present (1/9/2023)    [employer] - Occupational Stress Questionnaire     Feeling of Stress : Not at all   Social Connections: Socially Isolated (1/9/2023)    Social Connection and Isolation Panel [NHANES]     Frequency of Communication with Friends and Family: Once a week     Frequency of Social Gatherings with Friends and Family: Never     Attends Evangelical Services: Never     Active Member of Clubs or Organizations: No     Attends Club or Organization Meetings: Never     Marital Status:    Intimate Partner Violence: Not on file   Housing Stability: Low Risk  (1/9/2023)    Housing Stability Vital Sign     Unable to Pay for Housing in the Last Year: No     Number of Places Lived in the Last Year: 2     Unstable Housing in the Last Year: No       Allergies as of 08/12/2024    (No Known Allergies)          Current medications as of today   Current Outpatient Medications   Medication Sig Dispense Refill    zaleplon (SONATA) 5 MG capsule TAKE 1 CAPSULE BY MOUTH EVERY DAY AT BEDTIME AS NEEDED      lamoTRIgine (LAMICTAL) 100 MG Tab PLEASE SEE ATTACHED FOR DETAILED DIRECTIONS      hydrocortisone 2.5 % Cream topical cream APPLY to affected hemorrhoids bid prn 28 g 3    lamotrigine (LAMICTAL) 200 MG tablet       zolpidem (AMBIEN) 5 MG Tab Take 5 mg by mouth at bedtime as needed. (Patient not taking: Reported on 8/12/2024)      buPROPion (WELLBUTRIN XL) 150 MG XL tablet Take 150 mg by mouth every morning. (Patient not taking: Reported on 8/12/2024)      terbinafine (LAMISIL) 250 MG Tab Take 1 Tablet by mouth every day.  (Patient not taking: Reported on 8/12/2024) 30 Tablet 1    OLANZapine (ZYPREXA) 2.5 MG Tab Take 1 Tablet by mouth every day. (Patient not taking: Reported on 8/12/2024)       No current facility-administered medications for this visit.          Objective:   Physical Exam:  Constitutional: Alert, no distress, well-groomed.  Skin: No rashes in visible areas.  Eye: Round. Conjunctiva clear, lids normal. No icterus.   ENMT: Lips pink without lesions, good dentition, moist mucous membranes. Phonation normal.  Neck: No masses, no thyromegaly. Moves freely without pain.  Respiratory: Unlabored respiratory effort, no cough or audible wheeze  Psych: Alert and oriented x3, normal affect and mood.     Assessment and Plan:   The following treatment plan was discussed:     1. GUILLE on CPAP  - DME Mask and Supplies    Unable to download compliance to assess for contributing to sleep related issues but patient states continued use and benefit.  He has had approximately 20 pound weight change.  He is using a fullface mask and a chinstrap so should have no significant leak.  He does report however his humidifier running dry overnight.  Patient did send compliance report via [employer] but we were unable to retrieve.  Will need to bring device in for manual download.  Update patient versus MyChart regarding compliance results.  Send updated orders for mask and supplies.  Patient was reminded to use distilled water only in humidifier chamber, reviewed equipment cleaning as well as equipment replacement schedule.  If pressure changes are indicated will need follow-up in 3 months otherwise annual follow-up needed.    2. Insomnia, unspecified type  - Referral to Behavioral Health    Other orders  - zaleplon (SONATA) 5 MG capsule; TAKE 1 CAPSULE BY MOUTH EVERY DAY AT BEDTIME AS NEEDED    Patient is taking medication with stated benefit, he is not currently followed by behavioral health counselor other than medication management.  Reviewed  sleep hygiene.  Patient is interested in referral to Dr. Candis Boyd and this order was placed.      Follow-up:   Return in about 3 months (around 11/12/2024) for Return with Ryann Arce PA-C.

## 2024-08-29 ENCOUNTER — OFFICE VISIT (OUTPATIENT)
Dept: MEDICAL GROUP | Facility: MEDICAL CENTER | Age: 49
End: 2024-08-29
Payer: COMMERCIAL

## 2024-08-29 VITALS
TEMPERATURE: 97.1 F | WEIGHT: 176 LBS | HEART RATE: 53 BPM | DIASTOLIC BLOOD PRESSURE: 60 MMHG | HEIGHT: 72 IN | OXYGEN SATURATION: 98 % | SYSTOLIC BLOOD PRESSURE: 108 MMHG | BODY MASS INDEX: 23.84 KG/M2

## 2024-08-29 DIAGNOSIS — B35.1 ONYCHOMYCOSIS: Chronic | ICD-10-CM

## 2024-08-29 DIAGNOSIS — M54.50 ACUTE LOW BACK PAIN, UNSPECIFIED BACK PAIN LATERALITY, UNSPECIFIED WHETHER SCIATICA PRESENT: ICD-10-CM

## 2024-08-29 DIAGNOSIS — E78.41 ELEVATED LIPOPROTEIN(A): ICD-10-CM

## 2024-08-29 DIAGNOSIS — R73.03 PREDIABETES: Chronic | ICD-10-CM

## 2024-08-29 DIAGNOSIS — K21.9 GASTROESOPHAGEAL REFLUX DISEASE WITHOUT ESOPHAGITIS: ICD-10-CM

## 2024-08-29 RX ORDER — TERBINAFINE HYDROCHLORIDE 250 MG/1
250 TABLET ORAL DAILY
Qty: 30 TABLET | Refills: 3 | Status: SHIPPED | OUTPATIENT
Start: 2024-08-29

## 2024-08-29 NOTE — PROGRESS NOTES
Subjective:     CC:     HPI:   Moy presents today with    PMH prediabetes, hypercholesterolemia/ lipoprotein a 300s/ ct coronary score 0(), ilan on cpap ,onychomycosis on terbinafin, hemorrhoid, vit D deficiency,       Today he present to discuss about his cholesterol, ascvd, onychomycosis, gerd and prediabetes    Verbal consent was acquired by the patient to use [employer] ambient listening note generation during this visit Yes   History of Present Illness  # prediabetes  He expresses concern about his elevated blood sugar levels, even though his last reading was within the normal range. He also reports experiencing fatigue for two days after swimming a mile, which he suspects might be related to high blood sugar levels.    # hypercholesterolemia  # lp(a) 300s  He is worried about his high cholesterol levels and is curious about the potential side effects of statin therapy. He has not yet scheduled an appointment with vascular medicine as previously discussed.    # onychomycosis  He has been dealing with recurrent toenail fungus. Despite undergoing partial toenail removal by a podiatrist and using oral and topical terbinafine for two months, the fungus has returned. He did not experience any side effects from the medication.    # lower back pain acute  He has been experiencing significant lower back pain, particularly upon rising from bed. He also mentions a previous incident of straining his back while swimming. He has not sought any treatment for these issues but is considering physical therapy or switching to a firmer mattress.    #gerd  He has recently started experiencing heartburn, a new symptom for him, which he attributes to consuming spicy chips before bedtime. He is interested in exploring treatment options for this condition.    FAMILY HISTORY  His grandfather  of a heart attack. His sister has high cholesterol and she is on statins.        Health Maintenance:     ROS:  ROS    Objective:      Exam:  /60 (BP Location: Left arm)   Pulse (!) 53   Temp 36.2 °C (97.1 °F)   Ht 1.829 m (6')   Wt 79.8 kg (176 lb)   SpO2 98%   BMI 23.87 kg/m²  Body mass index is 23.87 kg/m².    Physical Exam  Constitutional:       Appearance: Normal appearance.   HENT:      Head: Normocephalic and atraumatic.   Pulmonary:      Effort: No respiratory distress.   Musculoskeletal:      Cervical back: Normal range of motion and neck supple.   Neurological:      Mental Status: He is alert.   Psychiatric:         Mood and Affect: Mood normal.         Behavior: Behavior normal.           Labs:     Assessment & Plan:     49 y.o. male with the following -     1. Onychomycosis  Chronic stable  - terbinafine (LAMISIL) 250 MG Tab; Take 1 Tablet by mouth every day.  Dispense: 30 Tablet; Refill: 3  - Comp Metabolic Panel; Future    2. Prediabetes  Chronic stable  Asymptomatic   - HEMOGLOBIN A1C; Future  - cbc , cmp , tsh, lipid ordered    3. Gastroesophageal reflux disease without esophagitis  Chronic stable  Recc otc medication famotidine prn     4. Acute low back pain, unspecified back pain laterality, unspecified whether sciatica present  Discuss stretches and otc medication    5. Elevated lipoprotein(a)  Chronic stable  Elevated, discussed ascvd score, fam history, ct coronary calcium  He declined med therapy at this time  He was previously referred to vascular, I asked patient to schedule       Return in about 6 months (around 2/28/2025) for Lab review, Med check.    Please note that this dictation was created using voice recognition software. I have made every reasonable attempt to correct obvious errors, but I expect that there are errors of grammar and possibly content that I did not discover before finalizing the note.

## 2024-08-30 ENCOUNTER — APPOINTMENT (OUTPATIENT)
Dept: MEDICAL GROUP | Facility: MEDICAL CENTER | Age: 49
End: 2024-08-30
Payer: COMMERCIAL

## 2024-10-01 ENCOUNTER — APPOINTMENT (RX ONLY)
Dept: URBAN - METROPOLITAN AREA CLINIC 4 | Facility: CLINIC | Age: 49
Setting detail: DERMATOLOGY
End: 2024-10-01

## 2024-10-01 DIAGNOSIS — L85.8 OTHER SPECIFIED EPIDERMAL THICKENING: ICD-10-CM

## 2024-10-01 PROBLEM — D48.5 NEOPLASM OF UNCERTAIN BEHAVIOR OF SKIN: Status: ACTIVE | Noted: 2024-10-01

## 2024-10-01 PROCEDURE — 11102 TANGNTL BX SKIN SINGLE LES: CPT

## 2024-10-01 PROCEDURE — ? COUNSELING

## 2024-10-01 PROCEDURE — ? BIOPSY BY SHAVE METHOD

## 2024-10-01 ASSESSMENT — LOCATION ZONE DERM: LOCATION ZONE: FACE

## 2024-10-01 ASSESSMENT — LOCATION SIMPLE DESCRIPTION DERM: LOCATION SIMPLE: LEFT FOREHEAD

## 2024-10-01 ASSESSMENT — LOCATION DETAILED DESCRIPTION DERM: LOCATION DETAILED: LEFT LATERAL FOREHEAD

## 2024-12-29 ENCOUNTER — OFFICE VISIT (OUTPATIENT)
Dept: URGENT CARE | Facility: CLINIC | Age: 49
End: 2024-12-29
Payer: COMMERCIAL

## 2024-12-29 VITALS
SYSTOLIC BLOOD PRESSURE: 98 MMHG | BODY MASS INDEX: 23.88 KG/M2 | TEMPERATURE: 97.5 F | HEIGHT: 72 IN | OXYGEN SATURATION: 97 % | DIASTOLIC BLOOD PRESSURE: 56 MMHG | RESPIRATION RATE: 16 BRPM | WEIGHT: 176.3 LBS | HEART RATE: 59 BPM

## 2024-12-29 DIAGNOSIS — R05.1 ACUTE COUGH: ICD-10-CM

## 2024-12-29 RX ORDER — BENZONATATE 100 MG/1
100 CAPSULE ORAL 3 TIMES DAILY PRN
Qty: 60 CAPSULE | Refills: 0 | Status: SHIPPED | OUTPATIENT
Start: 2024-12-29

## 2024-12-29 RX ORDER — METHYLPREDNISOLONE 4 MG/1
4 TABLET ORAL DAILY
Qty: 21 TABLET | Refills: 0 | Status: SHIPPED | OUTPATIENT
Start: 2024-12-29

## 2024-12-29 ASSESSMENT — ENCOUNTER SYMPTOMS
SHORTNESS OF BREATH: 1
CONSTIPATION: 0
EYE PAIN: 0
WHEEZING: 0
VOMITING: 0
HEADACHES: 0
DIZZINESS: 0
SINUS PAIN: 0
EYE DISCHARGE: 0
DIAPHORESIS: 0
CHILLS: 0
DIARRHEA: 0
FEVER: 0
SORE THROAT: 0
NAUSEA: 0
EYE REDNESS: 0
COUGH: 1
ABDOMINAL PAIN: 0

## 2024-12-29 NOTE — PROGRESS NOTES
Subjective:     Moy Enamorado  is a 49 y.o. male who presents for Cough ( x 5 days)       He presents today for a lingering cough ongoing over the last 5 days.  Does have episodes of shortness of breath and generalized fatigue but these do not occur for prolonged periods of time.  Does present today with family members whom have had similar symptoms for similar duration.  At this time he denies any fevers, no chest pain, no nausea or vomiting, no abdominal pain, no diarrhea.  Using over-the-counter medications for symptom support.         Review of Systems   Constitutional:  Positive for malaise/fatigue. Negative for chills, diaphoresis and fever.   HENT:  Negative for congestion, ear discharge, sinus pain and sore throat.    Eyes:  Negative for pain, discharge and redness.   Respiratory:  Positive for cough and shortness of breath. Negative for wheezing.    Cardiovascular:  Negative for chest pain.   Gastrointestinal:  Negative for abdominal pain, constipation, diarrhea, nausea and vomiting.   Neurological:  Negative for dizziness and headaches.      No Known Allergies  Past Medical History:   Diagnosis Date    Asthma     Childhood    Bipolar 1 disorder (HCC)     Chickenpox     Migraine         Objective:   BP 98/56 (BP Location: Left arm, Patient Position: Sitting, BP Cuff Size: Adult)   Pulse (!) 59   Temp 36.4 °C (97.5 °F) (Temporal)   Resp 16   Ht 1.829 m (6')   Wt 80 kg (176 lb 4.8 oz)   SpO2 97%   BMI 23.91 kg/m²   Physical Exam  Vitals and nursing note reviewed.   Constitutional:       General: He is not in acute distress.     Appearance: Normal appearance. He is not ill-appearing, toxic-appearing or diaphoretic.   HENT:      Head: Normocephalic.      Right Ear: Tympanic membrane, ear canal and external ear normal. There is no impacted cerumen.      Left Ear: Tympanic membrane, ear canal and external ear normal. There is no impacted cerumen.      Nose: No congestion or rhinorrhea.       Mouth/Throat:      Mouth: Mucous membranes are moist.      Pharynx: No oropharyngeal exudate or posterior oropharyngeal erythema.   Eyes:      General:         Right eye: No discharge.         Left eye: No discharge.      Conjunctiva/sclera: Conjunctivae normal.   Cardiovascular:      Rate and Rhythm: Normal rate and regular rhythm.   Pulmonary:      Effort: Pulmonary effort is normal. No respiratory distress.      Breath sounds: Normal breath sounds. No stridor. No wheezing, rhonchi or rales.   Musculoskeletal:      Cervical back: Neck supple.   Lymphadenopathy:      Cervical: No cervical adenopathy.   Neurological:      General: No focal deficit present.      Mental Status: He is alert and oriented to person, place, and time.   Psychiatric:         Mood and Affect: Mood normal.         Behavior: Behavior normal.         Thought Content: Thought content normal.         Judgment: Judgment normal.             Diagnostic testing: None    Assessment/Plan:     Encounter Diagnoses   Name Primary?    Acute cough          Plan for care for today's complaint includes starting the patient on Medrol Dosepak and Tessalon Perles for acute cough symptom support.  patient is likely suffering from a viral upper respiratory illness, no evidence to support antibiotic use at this time.  Lung auscultation was normal today, no rales, wheezes, rhonchi.  Vital signs were stable during today's office visit, patient was overall well-appearing. Continue to monitor symptoms and return to urgent care or follow-up with primary care provider if symptoms remain ongoing.  Follow-up in the emergency department if symptoms become severe, ER precautions discussed in office today.  Prescription for Medrol Dosepak, Tessalon Perles provided.    See AVS Instructions below for written guidance provided to patient on after-visit management and care in addition to our verbal discussion during the visit.    Please note that this dictation was created using  voice recognition software. I have attempted to correct all errors, but there may be sound-alike, spelling, grammar and possibly content errors that I did not discover before finalizing the note.    Orlandowillie Ohara PA-C

## 2025-06-02 DIAGNOSIS — Z11.3 SCREENING EXAMINATION FOR SEXUALLY TRANSMITTED DISEASE: Primary | ICD-10-CM

## 2025-06-16 ENCOUNTER — HOSPITAL ENCOUNTER (OUTPATIENT)
Dept: LAB | Facility: MEDICAL CENTER | Age: 50
End: 2025-06-16
Payer: COMMERCIAL

## 2025-06-16 DIAGNOSIS — Z11.3 SCREENING EXAMINATION FOR SEXUALLY TRANSMITTED DISEASE: ICD-10-CM

## 2025-06-16 PROCEDURE — 86803 HEPATITIS C AB TEST: CPT

## 2025-06-16 PROCEDURE — 87491 CHLMYD TRACH DNA AMP PROBE: CPT

## 2025-06-16 PROCEDURE — 36415 COLL VENOUS BLD VENIPUNCTURE: CPT

## 2025-06-16 PROCEDURE — 87591 N.GONORRHOEAE DNA AMP PROB: CPT

## 2025-06-16 PROCEDURE — 87389 HIV-1 AG W/HIV-1&-2 AB AG IA: CPT

## 2025-06-16 PROCEDURE — 86780 TREPONEMA PALLIDUM: CPT

## 2025-06-17 ENCOUNTER — RESULTS FOLLOW-UP (OUTPATIENT)
Dept: MEDICAL GROUP | Facility: MEDICAL CENTER | Age: 50
End: 2025-06-17

## 2025-06-17 ENCOUNTER — HOSPITAL ENCOUNTER (OUTPATIENT)
Dept: LAB | Facility: MEDICAL CENTER | Age: 50
End: 2025-06-17
Attending: STUDENT IN AN ORGANIZED HEALTH CARE EDUCATION/TRAINING PROGRAM
Payer: COMMERCIAL

## 2025-06-17 DIAGNOSIS — B35.1 ONYCHOMYCOSIS: Chronic | ICD-10-CM

## 2025-06-17 DIAGNOSIS — E78.41 ELEVATED LIPOPROTEIN(A): ICD-10-CM

## 2025-06-17 DIAGNOSIS — K21.9 GASTROESOPHAGEAL REFLUX DISEASE WITHOUT ESOPHAGITIS: ICD-10-CM

## 2025-06-17 DIAGNOSIS — R73.03 PREDIABETES: Chronic | ICD-10-CM

## 2025-06-17 LAB
ALBUMIN SERPL BCP-MCNC: 4.4 G/DL (ref 3.2–4.9)
ALBUMIN/GLOB SERPL: 1.8 G/DL
ALP SERPL-CCNC: 89 U/L (ref 30–99)
ALT SERPL-CCNC: 34 U/L (ref 2–50)
ANION GAP SERPL CALC-SCNC: 8 MMOL/L (ref 7–16)
AST SERPL-CCNC: 23 U/L (ref 12–45)
BILIRUB SERPL-MCNC: 0.7 MG/DL (ref 0.1–1.5)
BUN SERPL-MCNC: 19 MG/DL (ref 8–22)
C TRACH DNA SPEC QL NAA+PROBE: NEGATIVE
CALCIUM ALBUM COR SERPL-MCNC: 9.4 MG/DL (ref 8.5–10.5)
CALCIUM SERPL-MCNC: 9.7 MG/DL (ref 8.5–10.5)
CHLORIDE SERPL-SCNC: 102 MMOL/L (ref 96–112)
CHOLEST SERPL-MCNC: 198 MG/DL (ref 100–199)
CO2 SERPL-SCNC: 28 MMOL/L (ref 20–33)
CREAT SERPL-MCNC: 1.04 MG/DL (ref 0.5–1.4)
EST. AVERAGE GLUCOSE BLD GHB EST-MCNC: 108 MG/DL
GFR SERPLBLD CREATININE-BSD FMLA CKD-EPI: 87 ML/MIN/1.73 M 2
GLOBULIN SER CALC-MCNC: 2.5 G/DL (ref 1.9–3.5)
GLUCOSE SERPL-MCNC: 108 MG/DL (ref 65–99)
HBA1C MFR BLD: 5.4 % (ref 4–5.6)
HCV AB SER QL: NORMAL
HDLC SERPL-MCNC: 56 MG/DL
HIV 1+2 AB+HIV1 P24 AG SERPL QL IA: NORMAL
LDLC SERPL CALC-MCNC: 128 MG/DL
N GONORRHOEA DNA SPEC QL NAA+PROBE: NEGATIVE
POTASSIUM SERPL-SCNC: 4.5 MMOL/L (ref 3.6–5.5)
PROT SERPL-MCNC: 6.9 G/DL (ref 6–8.2)
SODIUM SERPL-SCNC: 138 MMOL/L (ref 135–145)
SPECIMEN SOURCE: NORMAL
T PALLIDUM AB SER QL IA: NORMAL
TRIGL SERPL-MCNC: 72 MG/DL (ref 0–149)

## 2025-06-17 PROCEDURE — 36415 COLL VENOUS BLD VENIPUNCTURE: CPT

## 2025-06-17 PROCEDURE — 80053 COMPREHEN METABOLIC PANEL: CPT

## 2025-06-17 PROCEDURE — 83036 HEMOGLOBIN GLYCOSYLATED A1C: CPT

## 2025-06-17 PROCEDURE — 80061 LIPID PANEL: CPT

## 2025-06-18 ENCOUNTER — RESULTS FOLLOW-UP (OUTPATIENT)
Dept: MEDICAL GROUP | Facility: MEDICAL CENTER | Age: 50
End: 2025-06-18

## 2025-08-08 ENCOUNTER — OFFICE VISIT (OUTPATIENT)
Dept: URGENT CARE | Facility: CLINIC | Age: 50
End: 2025-08-08
Payer: COMMERCIAL

## 2025-08-08 VITALS
OXYGEN SATURATION: 99 % | TEMPERATURE: 97.6 F | BODY MASS INDEX: 22.08 KG/M2 | HEIGHT: 72 IN | RESPIRATION RATE: 16 BRPM | SYSTOLIC BLOOD PRESSURE: 124 MMHG | DIASTOLIC BLOOD PRESSURE: 70 MMHG | HEART RATE: 65 BPM | WEIGHT: 163 LBS

## 2025-08-08 DIAGNOSIS — T14.8XXA PUNCTURE WOUND: ICD-10-CM

## 2025-08-08 DIAGNOSIS — R59.1 LYMPHADENOPATHY: Primary | ICD-10-CM

## 2025-08-08 PROCEDURE — 3074F SYST BP LT 130 MM HG: CPT | Performed by: PHYSICIAN ASSISTANT

## 2025-08-08 PROCEDURE — 3078F DIAST BP <80 MM HG: CPT | Performed by: PHYSICIAN ASSISTANT

## 2025-08-08 PROCEDURE — 99214 OFFICE O/P EST MOD 30 MIN: CPT | Performed by: PHYSICIAN ASSISTANT

## 2025-08-08 ASSESSMENT — ENCOUNTER SYMPTOMS
DOUBLE VISION: 0
HEADACHES: 0
BLURRED VISION: 0
FEVER: 0
CHILLS: 0
DIZZINESS: 0
WEIGHT LOSS: 0
TINGLING: 0
MYALGIAS: 0
DIAPHORESIS: 0
NECK PAIN: 0

## 2025-08-08 ASSESSMENT — FIBROSIS 4 INDEX: FIB4 SCORE: 0.76

## 2025-08-12 ENCOUNTER — OFFICE VISIT (OUTPATIENT)
Dept: MEDICAL GROUP | Facility: MEDICAL CENTER | Age: 50
End: 2025-08-12
Payer: COMMERCIAL

## 2025-08-12 VITALS
WEIGHT: 165 LBS | OXYGEN SATURATION: 100 % | TEMPERATURE: 99.2 F | RESPIRATION RATE: 16 BRPM | BODY MASS INDEX: 22.35 KG/M2 | SYSTOLIC BLOOD PRESSURE: 92 MMHG | DIASTOLIC BLOOD PRESSURE: 60 MMHG | HEIGHT: 72 IN | HEART RATE: 57 BPM

## 2025-08-12 DIAGNOSIS — B35.1 ONYCHOMYCOSIS: Chronic | ICD-10-CM

## 2025-08-12 DIAGNOSIS — R22.9 SUBCUTANEOUS MASS: ICD-10-CM

## 2025-08-12 DIAGNOSIS — F31.9 BIPOLAR 1 DISORDER (HCC): Chronic | ICD-10-CM

## 2025-08-12 DIAGNOSIS — N52.9 ERECTILE DYSFUNCTION, UNSPECIFIED ERECTILE DYSFUNCTION TYPE: ICD-10-CM

## 2025-08-12 DIAGNOSIS — N52.8 OTHER MALE ERECTILE DYSFUNCTION: ICD-10-CM

## 2025-08-12 DIAGNOSIS — E78.41 ELEVATED LIPOPROTEIN(A): ICD-10-CM

## 2025-08-12 DIAGNOSIS — E78.5 DYSLIPIDEMIA: Chronic | ICD-10-CM

## 2025-08-12 DIAGNOSIS — G47.33 OSA (OBSTRUCTIVE SLEEP APNEA): Primary | ICD-10-CM

## 2025-08-12 PROBLEM — Z71.84 TRAVEL ADVICE ENCOUNTER: Status: RESOLVED | Noted: 2017-11-16 | Resolved: 2025-08-12

## 2025-08-12 PROCEDURE — 99214 OFFICE O/P EST MOD 30 MIN: CPT | Performed by: STUDENT IN AN ORGANIZED HEALTH CARE EDUCATION/TRAINING PROGRAM

## 2025-08-12 PROCEDURE — 3078F DIAST BP <80 MM HG: CPT | Performed by: STUDENT IN AN ORGANIZED HEALTH CARE EDUCATION/TRAINING PROGRAM

## 2025-08-12 PROCEDURE — 3074F SYST BP LT 130 MM HG: CPT | Performed by: STUDENT IN AN ORGANIZED HEALTH CARE EDUCATION/TRAINING PROGRAM

## 2025-08-12 RX ORDER — ZOLPIDEM TARTRATE 5 MG/1
5 TABLET ORAL NIGHTLY PRN
COMMUNITY

## 2025-08-12 RX ORDER — TERBINAFINE HYDROCHLORIDE 250 MG/1
250 TABLET ORAL DAILY
Qty: 30 TABLET | Refills: 2 | Status: SHIPPED | OUTPATIENT
Start: 2025-08-12

## 2025-08-12 RX ORDER — PROPRANOLOL HYDROCHLORIDE 10 MG/1
10 TABLET ORAL
COMMUNITY

## 2025-08-12 RX ORDER — OLANZAPINE 5 MG/1
5-7.5 TABLET, FILM COATED ORAL
COMMUNITY

## 2025-08-12 RX ORDER — SILDENAFIL 25 MG/1
25 TABLET, FILM COATED ORAL
Qty: 10 TABLET | Refills: 3 | Status: SHIPPED | OUTPATIENT
Start: 2025-08-12

## 2025-08-12 ASSESSMENT — ENCOUNTER SYMPTOMS
HEADACHES: 0
SHORTNESS OF BREATH: 0
NAUSEA: 0
DIZZINESS: 0
CHILLS: 0
WHEEZING: 0
FEVER: 0
VOMITING: 0
WEIGHT LOSS: 0
PALPITATIONS: 0

## 2025-08-12 ASSESSMENT — FIBROSIS 4 INDEX: FIB4 SCORE: 0.76

## 2025-08-13 ENCOUNTER — TELEPHONE (OUTPATIENT)
Dept: VASCULAR LAB | Facility: MEDICAL CENTER | Age: 50
End: 2025-08-13
Payer: COMMERCIAL

## 2025-08-17 ENCOUNTER — HOSPITAL ENCOUNTER (OUTPATIENT)
Dept: RADIOLOGY | Facility: MEDICAL CENTER | Age: 50
End: 2025-08-17
Attending: STUDENT IN AN ORGANIZED HEALTH CARE EDUCATION/TRAINING PROGRAM
Payer: COMMERCIAL

## 2025-08-17 DIAGNOSIS — R22.9 SUBCUTANEOUS MASS: ICD-10-CM

## 2025-08-17 PROCEDURE — 76536 US EXAM OF HEAD AND NECK: CPT
